# Patient Record
Sex: FEMALE | HISPANIC OR LATINO | Employment: FULL TIME | URBAN - NONMETROPOLITAN AREA
[De-identification: names, ages, dates, MRNs, and addresses within clinical notes are randomized per-mention and may not be internally consistent; named-entity substitution may affect disease eponyms.]

---

## 2020-02-12 ENCOUNTER — OFFICE VISIT (OUTPATIENT)
Dept: URGENT CARE | Facility: PHYSICIAN GROUP | Age: 40
End: 2020-02-12
Payer: COMMERCIAL

## 2020-02-12 VITALS
BODY MASS INDEX: 27.34 KG/M2 | DIASTOLIC BLOOD PRESSURE: 70 MMHG | HEART RATE: 88 BPM | TEMPERATURE: 98.5 F | WEIGHT: 135.6 LBS | HEIGHT: 59 IN | OXYGEN SATURATION: 97 % | SYSTOLIC BLOOD PRESSURE: 110 MMHG | RESPIRATION RATE: 14 BRPM

## 2020-02-12 DIAGNOSIS — R35.0 URINARY FREQUENCY: ICD-10-CM

## 2020-02-12 DIAGNOSIS — N30.00 ACUTE CYSTITIS WITHOUT HEMATURIA: ICD-10-CM

## 2020-02-12 DIAGNOSIS — R30.9 PAINFUL URINATION: ICD-10-CM

## 2020-02-12 DIAGNOSIS — R39.15 URINARY URGENCY: ICD-10-CM

## 2020-02-12 LAB
APPEARANCE UR: ABNORMAL
BILIRUB UR STRIP-MCNC: NEGATIVE MG/DL
COLOR UR AUTO: YELLOW
GLUCOSE UR STRIP.AUTO-MCNC: NEGATIVE MG/DL
INT CON NEG: NEGATIVE
INT CON POS: POSITIVE
KETONES UR STRIP.AUTO-MCNC: NEGATIVE MG/DL
LEUKOCYTE ESTERASE UR QL STRIP.AUTO: ABNORMAL
NITRITE UR QL STRIP.AUTO: NEGATIVE
PH UR STRIP.AUTO: 7 [PH] (ref 5–8)
POC URINE PREGNANCY TEST: NEGATIVE
PROT UR QL STRIP: ABNORMAL MG/DL
RBC UR QL AUTO: ABNORMAL
SP GR UR STRIP.AUTO: 1.03
UROBILINOGEN UR STRIP-MCNC: 0.2 MG/DL

## 2020-02-12 PROCEDURE — 81002 URINALYSIS NONAUTO W/O SCOPE: CPT | Performed by: PHYSICIAN ASSISTANT

## 2020-02-12 PROCEDURE — 81025 URINE PREGNANCY TEST: CPT | Performed by: PHYSICIAN ASSISTANT

## 2020-02-12 PROCEDURE — 99204 OFFICE O/P NEW MOD 45 MIN: CPT | Performed by: PHYSICIAN ASSISTANT

## 2020-02-12 RX ORDER — NITROFURANTOIN 25; 75 MG/1; MG/1
100 CAPSULE ORAL EVERY 12 HOURS
Qty: 10 CAP | Refills: 0 | Status: SHIPPED | OUTPATIENT
Start: 2020-02-12 | End: 2020-02-17

## 2020-02-13 NOTE — PROGRESS NOTES
"Chief Complaint   Patient presents with   • Painful Urination     x 4-5 days       HISTORY OF PRESENT ILLNESS: Patient is a 39 y.o. female who presents today for the following:    Dysuria x 4-5 days  + Increased urinary frequency and urgency, lower abdominal pressure, mild nausea without vomiting  Denies fever, flank pain  History of UTI, feels the same    There are no active problems to display for this patient.      Allergies:Patient has no known allergies.    Current Outpatient Medications Ordered in Epic   Medication Sig Dispense Refill   • hydrocodone-acetaminophen (NORCO) 5-325 MG Tab per tablet Take 1 Tab by mouth every 6 hours as needed. (Patient not taking: Reported on 2/12/2020) 30 Tab 0     No current Epic-ordered facility-administered medications on file.        Past Medical History:   Diagnosis Date   • Headache(784.0)        Social History     Tobacco Use   • Smoking status: Never Smoker   • Smokeless tobacco: Never Used   Substance Use Topics   • Alcohol use: No   • Drug use: No       No family status information on file.     Family History   Problem Relation Age of Onset   • Diabetes Mother    • Hyperlipidemia Mother    • Cancer Maternal Grandmother    • Cancer Maternal Grandfather        Review of Systems:   Constitutional ROS: No unexpected change in weight, No weakness, No fatigue  Pulmonary ROS: No chronic cough, sputum, or hemoptysis, No dyspnea on exertion, No wheezing  Cardiovascular ROS: No diaphoresis, No edema, No palpitations  Gastrointestinal ROS: No change in bowel habits, No significant change in appetite, No nausea, vomiting, diarrhea, or constipation  Hematologic/Lymphatic ROS: No chills, No night sweats, No weight loss  Skin/Integumentary ROS: No edema, No evidence of rash, No itching      Exam:  /70   Pulse 88   Temp 36.9 °C (98.5 °F) (Temporal)   Resp 14   Ht 1.499 m (4' 11\")   Wt 61.5 kg (135 lb 9.6 oz)   SpO2 97%   General: Well developed, well nourished. No " distress.  Pulmonary: Unlabored respiratory effort.   Back: No CVA tenderness noted.  Neurologic: Grossly nonfocal. No facial asymmetry noted.  Skin: Warm, dry, good turgor. No rashes in visible areas.   Psych: Normal mood. Alert and oriented x3. Judgment and insight is normal.    Results for orders placed or performed in visit on 02/12/20   POCT Urinalysis   Result Value Ref Range    POC Color yellow Negative    POC Appearance cloudy Negative    POC Leukocyte Esterase small Negative    POC Nitrites negative Negative    POC Urobiligen 0.2 Negative (0.2) mg/dL    POC Protein trace Negative mg/dL    POC Urine PH 7.0 5.0 - 8.0    POC Blood trace Negative    POC Specific Gravity 1.030 <1.005 - >1.030    POC Ketones negative Negative mg/dL    POC Bilirubin negative Negative mg/dL    POC Glucose negative Negative mg/dL   POCT Pregnancy   Result Value Ref Range    POC Urine Pregnancy Test negative Negative    Internal Control Positive Positive     Internal Control Negative Negative          Assessment/Plan:  Drink plenty of fluids. Will contact patient with culture results. Use all medication as directed. Follow up for worsening or persistent symptoms.  1. Painful urination  POCT Urinalysis    POCT Pregnancy

## 2020-08-11 ENCOUNTER — NON-PROVIDER VISIT (OUTPATIENT)
Dept: URGENT CARE | Facility: PHYSICIAN GROUP | Age: 40
End: 2020-08-11
Payer: COMMERCIAL

## 2020-08-11 ENCOUNTER — APPOINTMENT (OUTPATIENT)
Dept: RADIOLOGY | Facility: IMAGING CENTER | Age: 40
End: 2020-08-11
Attending: PHYSICIAN ASSISTANT
Payer: COMMERCIAL

## 2020-08-11 ENCOUNTER — OCCUPATIONAL MEDICINE (OUTPATIENT)
Dept: URGENT CARE | Facility: PHYSICIAN GROUP | Age: 40
End: 2020-08-11
Payer: COMMERCIAL

## 2020-08-11 VITALS
TEMPERATURE: 98.5 F | DIASTOLIC BLOOD PRESSURE: 78 MMHG | HEIGHT: 59 IN | BODY MASS INDEX: 27.58 KG/M2 | HEART RATE: 92 BPM | WEIGHT: 136.8 LBS | OXYGEN SATURATION: 99 % | SYSTOLIC BLOOD PRESSURE: 118 MMHG | RESPIRATION RATE: 16 BRPM

## 2020-08-11 DIAGNOSIS — S60.021A CONTUSION OF RIGHT INDEX FINGER WITHOUT DAMAGE TO NAIL, INITIAL ENCOUNTER: ICD-10-CM
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
## 2020-08-11 DIAGNOSIS — Z02.1 PRE-EMPLOYMENT DRUG SCREENING: ICD-10-CM

## 2020-08-11 LAB
AMP AMPHETAMINE: NORMAL
BAR BARBITURATES: NORMAL
BREATH ALCOHOL COMMENT: NORMAL
BZO BENZODIAZEPINES: NORMAL
COC COCAINE: NORMAL
INT CON NEG: NORMAL
INT CON POS: NORMAL
MDMA ECSTASY: NORMAL
MET METHAMPHETAMINES: NORMAL
MTD METHADONE: NORMAL
OPI OPIATES: NORMAL
OXY OXYCODONE: NORMAL
PCP PHENCYCLIDINE: NORMAL
POC BREATHALIZER: 0 PERCENT (ref 0–0.01)
POC URINE DRUG SCREEN OCDRS: NEGATIVE
THC: NORMAL

## 2020-08-11 PROCEDURE — 80305 DRUG TEST PRSMV DIR OPT OBS: CPT | Performed by: PHYSICIAN ASSISTANT

## 2020-08-11 PROCEDURE — 99203 OFFICE O/P NEW LOW 30 MIN: CPT | Performed by: PHYSICIAN ASSISTANT

## 2020-08-11 PROCEDURE — 82075 ASSAY OF BREATH ETHANOL: CPT | Performed by: PHYSICIAN ASSISTANT

## 2020-08-11 PROCEDURE — 73130 X-RAY EXAM OF HAND: CPT | Mod: TC,FY,RT | Performed by: PHYSICIAN ASSISTANT

## 2020-08-11 ASSESSMENT — PAIN SCALES - GENERAL: PAINLEVEL: 8=MODERATE-SEVERE PAIN

## 2020-08-11 NOTE — LETTER
August 11, 2020    To Whom It May Concern:         This is confirmation that FeliciaKacy Richardson attended her scheduled appointment with Enio Cortez P.A.-C. on 8/11/20.           If you have any questions please do not hesitate to call me at the phone number listed below.    Sincerely,          Enio Cortez P.A.-C.  [phone]

## 2020-08-11 NOTE — LETTER
"EMPLOYEE’S CLAIM FOR COMPENSATION/ REPORT OF INITIAL TREATMENT  FORM C-4    EMPLOYEE’S CLAIM - PROVIDE ALL INFORMATION REQUESTED   First Name  Fallon Last Name  Danna Richardson Birthdate                    1980                Sex  female Claim Number   Home Address  [address] Age  39 y.o. 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 (if applicable)      How did this injury or occupational disease occur? (Be specific an answer in detail. Use additional sheet if necessary)     If you believe that you have an occupational disease, when did you first have knowledge of the disability and it relationship to your employment?  n/a Witnesses to the Accident  [place]      Nature of Injury or Occupational Disease  Workers' Compensation  Part(s) of Body Injured or Affected  Finger (R), Hand (R),     I certify that the above is true and correct to the best of my knowledge and that I have provided this information in order to obtain the benefits of [place]). "  I hereby authorize any physician, chiropractor, surgeon, practitioner, or other person, any hospital, including [facility] or [facility], any medical service organization, any insurance company, or other institution or organization to release to each other, any medical or other information, including benefits paid or payable, pertinent to this injury or disease, except information relative to diagnosis, treatment and/or counseling for AIDS, psychological conditions, alcohol or controlled substances, for which I must give specific authorization.  A Photostat of this authorization shall be as valid as the original.       Date 8-   [facility]   Employee’s Signature   THIS REPORT MUST BE COMPLETED AND MAILED WITHIN 3 WORKING DAYS OF TREATMENT   [facility]  Name of Facility  [facility]   Date  8/11/2020 Diagnosis  (S60.021A) Contusion of right index finger without damage to nail, initial encounter Is there evidence the injured employee was under the              influence of alcohol and/or another controlled substance at the time of accident?   Hour  6:01 PM Description of Injury or Disease  The encounter diagnosis was Contusion of right index finger without damage to nail, initial encounter. No   Treatment  X-ray images were negative for fracture.  Patient was placed in an aluminum finger splint in a position of comfort and instructed to wear the splint and avoid use of the right hand.  She can ice for 10 to 15 minutes every 2-3 hours and take over-the-counter Tylenol and ibuprofen for pain and discomfort.  I told her to limit use of the finger and return in 3 to 4 days for reevaluation.  Have you advised the patient to remain off work five days or     more? No   X-Ray Findings  Negative   If Yes   From Date  To Date      From information given by the employee, together with medical evidence, can you directly connect this injury or occupational disease as job  "incurred?  Yes If No Full Duty    No Modified Duty  Yes   Is additional medical care by a physician indicated?  Yes If Modified Duty, Specify any Limitations / Restrictions  Limit use of right hand, no lifting with right hand, avoid any actions that cause pain.  Patient must wear splint at all times at work until reevaluated   Do you know of any previous injury or disease contributing to this condition or occupational disease?                            No   Date  8/11/2020 Print Doctor’s Name   Enio Cortez P.A.-C. I certify the employer’s copy of  this form was mailed on:   Address  [address] Insurer’s Use Only     [facility] [facility]  [address]    Provider’s Tax ID Number  [id number] Telephone  Dept: [phone]      e-ENIO Ramriez P.A.-C.  Signature:     Degree          ORIGINAL-TREATING PHYSICIAN OR CHIROPRACTOR    PAGE 2-INSURER/TPA    PAGE 3-EMPLOYER    PAGE 4-EMPLOYEE        Form C-4 (rev.10/07)          BRIEF DESCRIPTION OF RIGHTS AND BENEFITS  (Pursuant to [place]RS 616C.050)    Notice of Injury or Occupational Disease (Incident Report Form C-1): If an injury or occupational disease (OD) arises out of and in the course of employment, you must provide written notice to your employer as soon as practicable, but no later than 7 days after the accident or OD. Your employer shall maintain a sufficient supply of the required forms.     Claim for Compensation (Form C-4): If medical treatment is sought, the form C-4 is available at the place of initial treatment. A completed \"Claim for Compensation\" (Form C-4) must be filed within 90 days after an accident or OD. The treating physician or chiropractor must, within 3 working days after treatment, complete and mail to the employer, the employer's insurer and third-party , the Claim for Compensation.     Medical Treatment: If you require medical treatment for your on-the-job injury or OD, you may be required to select a " physician or chiropractor from a list provided by your workers’ compensation insurer, if it has contracted with an Organization for Managed Care (MCO) or Preferred Provider Organization (PPO) or providers of health care. If your employer has not entered into a contract with an MCO or PPO, you may select a physician or chiropractor from the Panel of Physicians and Chiropractors. Any medical costs related to your industrial injury or OD will be paid by your insurer.     Temporary Total Disability (TTD): If your doctor has certified that you are unable to work for a period of at least 5 consecutive days, or 5 cumulative days in a 20-day period, or places restrictions on you that your employer does not accommodate, you may be entitled to TTD compensation.     Temporary Partial Disability (TPD): If the wage you receive upon reemployment is less than the compensation for TTD to which you are entitled, the insurer may be required to pay you TPD compensation to make up the difference. TPD can only be paid for a maximum of 24 months.     Permanent Partial Disability (PPD): When your medical condition is stable and there is an indication of a PPD as a result of your injury or OD, within 30 days, your insurer must arrange for an evaluation by a rating physician or chiropractor to determine the degree of your PPD. The amount of your PPD award depends on the date of injury, the results of the PPD evaluation and your age and wage.     Permanent Total Disability (PTD): If you are medically certified by a treating physician or chiropractor as permanently and totally disabled and have been granted a PTD status by your insurer, you are entitled to receive monthly benefits not to exceed 66 2/3% of your average monthly wage. The amount of your PTD payments is subject to reduction if you previously received a PPD award.     Vocational Rehabilitation Services: You may be eligible for vocational rehabilitation services if you are unable  to return to the job due to a permanent physical impairment or permanent restrictions as a result of your injury or occupational disease.     Transportation and Per Bharathi Reimbursement: You may be eligible for travel expenses and per bharathi associated with medical treatment.     Reopening: You may be able to reopen your claim if your condition worsens after claim closure.     Appeal Process: If you disagree with a written determination issued by the insurer or the insurer does not respond to your request, you may appeal to the Department of Administration, , by following the instructions contained in your determination letter. You must appeal the determination within 70 days from the date of the determination letter at [address], [address], [place], [place] [address], or [address], [address], [place], [place] [address]. If you disagree with the  decision, you may appeal to the Department of Administration, . You must file your appeal within 30 days from the date of the  decision letter at [address], [address], [place], [place] [address], or [address] [facility] [address], [place] [address]. If you disagree with a decision of an , you may file a petition for judicial review with the District Court. You must do so within 30 days of the Appeal Officer’s decision. You may be represented by an  at your own expense or you may contact the [facility] for possible representation.     [place]  for Injured Workers (NAIW): If you disagree with a  decision, you may request that NAIW represent you without charge at an  Hearing. For information regarding denial of benefits, you may contact the [facility] at: [address], [address], [place] [address], [phone], or [address] [address], [phone]     To File a Complaint with the  Division: If you wish to file a complaint with the  of the Division of Industrial Relations (DIR), please contact the Workers’ Compensation Section, [address], [address], [place], [place] [address], telephone [phone], or [address], [address], [place], [place] [address], telephone [phone].     For assistance with Workers’ Compensation Issues: You may contact the Office of the Governor Consumer Health Assistance, [address] [place] [address], [address], [place], [place] [address], Toll Free 1-647.885.1990, Web site: [email or web address], E-mail [email or web address].              __________________________________________________________________                              ___________________         Employee Name / Signature                                                                                                                     Date                                                                                                                                                                                       D-2 (rev. 01/20)

## 2020-08-11 NOTE — LETTER
[address] [place] [address]  Phone:  [phone] - Fax:  [phone]   [facility] Progress Report and Disability Certification  Date of Service: 8/11/2020   No Show:  No  Date / Time of Next Visit: 8/15/2020   Claim Information   Patient Name: Fallon Richardson  Claim Number:     Employer: [employer]  Date of Injury: 8/11/2020     Insurer / TPA: [facility]  ID / SSN:     Occupation: Co facilitator  Diagnosis: The encounter diagnosis was Contusion of right index finger without damage to nail, initial encounter.    Medical Information   Related to Industrial Injury? Yes    Subjective Complaints:  Date of injury August 11, 2020 around 0600 hrs.  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  She had moderate pain during the event and the pain continued throughout the day and became worse the more she used the hand.  She did not notice any skin breaks.  She has no previous injury she is a right-handed female with no second job.  She has range of motion of the hand intact but it causes pain.  She has not noticed any swelling.   Objective Findings: Alert nontoxic female in no acute distress.  Mild tenderness to palpation over the distal second metatarsal, MTP, and PIP.  Flexion extension of the hand and fingers is intact.  No neurovascular compromise.  Abduction of index finger to thumb is intact.  No tenderness over the other bony prominences of the hand.  No abrasion or laceration.  Brisk cap refill in the finger.  Sensation to dull and light touch intact.   Pre-Existing Condition(s): None   Assessment:   Initial Visit    Status: Additional Care Required  Permanent Disability:No    Plan:      Diagnostics: X-ray    Comments:  X-rays were negative for fracture suspect soft tissue injury    Disability Information   Status: Released to Restricted  Duty    From:  8/11/2020  Through: 8/15/2020 Restrictions are: Temporary   Physical Restrictions   Sitting:    Standing:    Stooping:    Bending:      Squatting:    Walking:    Climbing:    Pushing:      Pulling:    Other:    Reaching Above Shoulder (L):   Reaching Above Shoulder (R):       Reaching Below Shoulder (L):    Reaching Below Shoulder (R):      Not to exceed Weight Limits   Carrying(hrs):   Weight Limit(lb):   Lifting(hrs):   Weight  Limit(lb):     Comments: Limit use of right hand, no lifting with right hand, avoid any actions that cause pain.  Patient must wear splint at all times at work until reevaluated    Repetitive Actions   Hands: i.e. Fine Manipulations from Grasping:     Feet: i.e. Operating Foot Controls:     Driving / Operate Machinery:     Provider Name:   Enio Cortez P.A.-C. Physician Signature:  Physician Name:     Clinic Name / Location: [address] [address] Clinic Phone Number: Dept: [phone]   Appointment Time: 4:55 Pm Visit Start Time: 6:01 PM   Check-In Time:  5:59 Pm Visit Discharge Time: 7:15 Pm    Original-Treating Physician or Chiropractor    Page 2-Insurer/TPA    Page 3-Employer    Page 4-Employee

## 2020-08-12 NOTE — PROGRESS NOTES
"Subjective:     Fallon Richardson is a 39 y.o. female who presents for Work-Related Injury (happened at work around 6, around 2:30 felt the most pain)      Date of injury August 11, 2020 around 0600 hrs.  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  She had moderate pain during the event and the pain continued throughout the day and became worse the more she used the hand.  She did not notice any skin breaks.  She has no previous injury she is a right-handed female with no second job.  She has range of motion of the hand intact but it causes pain.  She has not noticed any swelling.    PMH:   No pertinent past medical history to this problem  MEDS:  Medications were reviewed in EMR  ALLERGIES:  Allergies were reviewed in EMR  SOCHX:  Works as a   FH:   No pertinent family history to this problem       Objective:     /78   Pulse 92   Temp 36.9 °C (98.5 °F) (Temporal)   Resp 16   Ht 1.499 m (4' 11\")   Wt 62.1 kg (136 lb 12.8 oz)   SpO2 99%   BMI 27.63 kg/m²     Alert nontoxic female in no acute distress.  Mild tenderness to palpation over the distal second metatarsal, MTP, and PIP.  Flexion extension of the hand and fingers is intact.  No neurovascular compromise.  Abduction of index finger to thumb is intact.  No tenderness over the other bony prominences of the hand.  No abrasion or laceration.  Brisk cap refill in the finger.  Sensation to dull and light touch intact.      RADIOLOGY RESULTS   Dx-hand 3+ Right    Result Date: 8/11/2020 8/11/2020 6:44 PM HISTORY/REASON FOR EXAM:  Pain/Deformity Following Trauma; crush injury this am, pain over 2nd MCP, PIP, no deformity, ROM NML TECHNIQUE/EXAM DESCRIPTION AND NUMBER OF VIEWS: 3 views of the RIGHT hand. COMPARISON:  None FINDINGS: There is no evidence of fracture or dislocation. Alignment is maintained. No periosteal new bone formation or osseous " erosion is identified.     No evidence of acute fracture or dislocation.           Assessment/Plan:       1. Contusion of right index finger without damage to nail, initial encounter  - DX-HAND 3+ RIGHT; Future    • Released to Restricted Duty FROM 8/11/2020 TO 8/15/2020  • Limit use of right hand, no lifting with right hand, avoid any actions that cause pain.  Patient must wear splint at all times at work until reevaluated  • X-rays were negative for fracture suspect soft tissue injury    Differential diagnosis, natural history, supportive care, and indications for immediate follow-up discussed.

## 2020-08-12 NOTE — PATIENT INSTRUCTIONS
RICE Therapy for Routine Care of Injuries  Many injuries can be cared for with rest, ice, compression, and elevation (RICE therapy). This includes:  · Resting the injured part.  · Putting ice on the injury.  · Putting pressure (compression) on the injury.  · Raising the injured part (elevation).  Using RICE therapy can help to lessen pain and swelling.  Supplies needed:  · Ice.  · Plastic bag.  · Towel.  · Elastic bandage.  · Pillow or pillows to raise (elevate) your injured body part.  How to care for your injury with RICE therapy  Rest  Limit your normal activities, and try not to use the injured part of your body. You can go back to your normal activities when your doctor says it is okay to do them and you feel okay. Ask your doctor if you should do exercises to help your injury get better.  Ice  Put ice on the injured area. Do not put ice on your bare skin.  · Put ice in a plastic bag.  · Place a towel between your skin and the bag.  · Leave the ice on for 20 minutes, 2-3 times a day. Use ice on as many days as told by your doctor.    Compression  Compression means putting pressure on the injured area. This can be done with an elastic bandage. If an elastic bandage has been put on your injury:  · Do not wrap the bandage too tight. Wrap the bandage more loosely if part of your body away from the bandage is blue, swollen, cold, painful, or loses feeling (gets numb).  · Take off the bandage and put it on again. Do this every 3-4 hours or as told by your doctor.  · See your doctor if the bandage seems to make your problems worse.    Elevation  Elevation means keeping the injured area raised. If you can, raise the injured area above your heart or the center of your chest.  Contact a doctor if:  · You keep having pain and swelling.  · Your symptoms get worse.  Get help right away if:  · You have sudden bad pain at your injury or lower than your injury.  · You have redness or more swelling around your injury.  · You  have tingling or numbness at your injury or lower than your injury, and it does not go away when you take off the bandage.  Summary  · Many injuries can be cared for using rest, ice, compression, and elevation (RICE therapy).  · You can go back to your normal activities when you feel okay and your doctor says it is okay.  · Put ice on the injured area as told by your doctor.  · Get help if your symptoms get worse or if you keep having pain and swelling.  This information is not intended to replace advice given to you by your health care provider. Make sure you discuss any questions you have with your health care provider.  Document Released: 06/05/2009 Document Revised: 09/07/2018 Document Reviewed: 09/07/2018  Elsevier Patient Education © 2020 Elsevier Inc.

## 2020-08-15 ENCOUNTER — OCCUPATIONAL MEDICINE (OUTPATIENT)
Dept: URGENT CARE | Facility: PHYSICIAN GROUP | Age: 40
End: 2020-08-15
Payer: COMMERCIAL

## 2020-08-15 VITALS
SYSTOLIC BLOOD PRESSURE: 124 MMHG | HEIGHT: 59 IN | BODY MASS INDEX: 27.7 KG/M2 | DIASTOLIC BLOOD PRESSURE: 84 MMHG | OXYGEN SATURATION: 99 % | HEART RATE: 85 BPM | RESPIRATION RATE: 16 BRPM | WEIGHT: 137.4 LBS | TEMPERATURE: 98.5 F

## 2020-08-15 DIAGNOSIS — S60.121D CONTUSION OF RIGHT INDEX FINGER WITH DAMAGE TO NAIL, SUBSEQUENT ENCOUNTER: ICD-10-CM

## 2020-08-15 PROCEDURE — 99213 OFFICE O/P EST LOW 20 MIN: CPT | Performed by: FAMILY MEDICINE

## 2020-08-15 ASSESSMENT — PAIN SCALES - GENERAL: PAINLEVEL: 4=SLIGHT-MODERATE PAIN

## 2020-08-15 NOTE — PROGRESS NOTES
"Subjective:      Fallon Richardson is a 39 y.o. female who presents with Follow-Up (workers comp follow up)      Date of injury August 11, 2020  Patient is here for follow-up crush injury on right index finger.  Still having some pain but it is slightly better.  She is wearing the splint at work     HPI    ROS       Objective:     /84   Pulse 85   Temp 36.9 °C (98.5 °F) (Temporal)   Resp 16   Ht 1.499 m (4' 11\")   Wt 62.3 kg (137 lb 6.4 oz)   SpO2 99%   BMI 27.75 kg/m²      Physical Exam    No acute distress  Full range of motion the right index finger.  Tenderness on palpation of the proximal phalanx which is slightly more swollen as compared to the left index finger.  Neurovascular otherwise intact.       Assessment/Plan:        1. Contusion of right index finger with damage to nail, subsequent encounter    Improved but still having some pain.  Continue restriction for another week.      "

## 2020-08-15 NOTE — LETTER
[address] [place] [address]  Phone:  [phone] - Fax:  [phone]   [facility] Progress Report and Disability Certification  Date of Service: 8/15/2020   No Show:  No  Date / Time of Next Visit: 8/22/2020   Claim Information   Patient Name: Fallon Richardson  Claim Number:     Employer: [employer]  Date of Injury: 8/11/2020     Insurer / TPA: [facility]  ID / SSN:     Occupation: Co facilitator  Diagnosis: The encounter diagnosis was Contusion of right index finger with damage to nail, subsequent encounter.    Medical Information   Related to Industrial Injury? Yes    Subjective Complaints:  Date of injury August 11, 2020  Patient is here for follow-up crush injury on right index finger.  Still having some pain but it is slightly better.  She is wearing the splint at work   Objective Findings: No acute distress  Full range of motion the right index finger.  Tenderness on palpation of the proximal phalanx which is slightly more swollen as compared to the left index finger.  Neurovascular otherwise intact.   Pre-Existing Condition(s):     Assessment:        Status: Additional Care Required  Permanent Disability:No    Plan:      Diagnostics:      Comments:       Disability Information   Status: Released to Restricted Duty    From:  8/15/2020  Through: 8/22/2020 Restrictions are: Temporary   Physical Restrictions   Sitting:    Standing:    Stooping:    Bending:      Squatting:    Walking:    Climbing:    Pushing:      Pulling:    Other:    Reaching Above Shoulder (L):   Reaching Above Shoulder (R):       Reaching Below Shoulder (L):    Reaching Below Shoulder (R):      Not to exceed Weight Limits   Carrying(hrs):   Weight Limit(lb):   Lifting(hrs):   Weight  Limit(lb):     Comments: Limited use of right hand.  Wear the splint on the right index finger if needed at work.  Over-the-counter medication as needed for pain.  Icing frequently helps.  Follow-up in a week in urgent care, sooner if any worsening or new symptoms.    Repetitive Actions   Hands: i.e. Fine Manipulations from Grasping:     Feet: i.e. Operating Foot Controls:     Driving / Operate Machinery:     Provider Name:   Adri Tipton M.D. Physician Signature:  Physician Name:     Clinic Name / Location: [address] [address] Clinic Phone Number: Dept: [phone]   Appointment Time: 9:00 Am Visit Start Time: 9:10 AM   Check-In Time:  8:20 Am Visit Discharge Time:  9:40 AM   Original-Treating Physician or Chiropractor    Page 2-Insurer/TPA    Page 3-Employer    Page 4-Employee

## 2022-11-18 ENCOUNTER — OFFICE VISIT (OUTPATIENT)
Dept: URGENT CARE | Facility: PHYSICIAN GROUP | Age: 42
End: 2022-11-18

## 2022-11-18 ENCOUNTER — HOSPITAL ENCOUNTER (OUTPATIENT)
Facility: MEDICAL CENTER | Age: 42
End: 2022-11-18
Attending: PHYSICIAN ASSISTANT

## 2022-11-18 VITALS
WEIGHT: 143 LBS | DIASTOLIC BLOOD PRESSURE: 60 MMHG | HEART RATE: 72 BPM | RESPIRATION RATE: 16 BRPM | BODY MASS INDEX: 27 KG/M2 | HEIGHT: 61 IN | SYSTOLIC BLOOD PRESSURE: 134 MMHG | OXYGEN SATURATION: 98 % | TEMPERATURE: 97 F

## 2022-11-18 DIAGNOSIS — R10.2 PELVIC PAIN: ICD-10-CM

## 2022-11-18 LAB
APPEARANCE UR: CLEAR
BILIRUB UR STRIP-MCNC: NEGATIVE MG/DL
COLOR UR AUTO: NORMAL
GLUCOSE UR STRIP.AUTO-MCNC: NEGATIVE MG/DL
INT CON NEG: NORMAL
INT CON POS: NORMAL
KETONES UR STRIP.AUTO-MCNC: NEGATIVE MG/DL
LEUKOCYTE ESTERASE UR QL STRIP.AUTO: NEGATIVE
NITRITE UR QL STRIP.AUTO: NEGATIVE
PH UR STRIP.AUTO: 5.5 [PH] (ref 5–8)
POC URINE PREGNANCY TEST: NEGATIVE
PROT UR QL STRIP: NORMAL MG/DL
RBC UR QL AUTO: NORMAL
SP GR UR STRIP.AUTO: 1.03
UROBILINOGEN UR STRIP-MCNC: 0.2 MG/DL

## 2022-11-18 PROCEDURE — 87086 URINE CULTURE/COLONY COUNT: CPT

## 2022-11-18 PROCEDURE — 99214 OFFICE O/P EST MOD 30 MIN: CPT | Performed by: PHYSICIAN ASSISTANT

## 2022-11-18 PROCEDURE — 81025 URINE PREGNANCY TEST: CPT | Performed by: PHYSICIAN ASSISTANT

## 2022-11-18 PROCEDURE — 81002 URINALYSIS NONAUTO W/O SCOPE: CPT | Performed by: PHYSICIAN ASSISTANT

## 2022-11-18 ASSESSMENT — ENCOUNTER SYMPTOMS
NAUSEA: 1
SHORTNESS OF BREATH: 0
CHANGE IN BOWEL HABIT: 0
ANOREXIA: 0
CHILLS: 0
VOMITING: 0
BACK PAIN: 1
HEADACHES: 0
SORE THROAT: 0
WHEEZING: 0
FLANK PAIN: 0
FATIGUE: 1
ABDOMINAL PAIN: 1
FEVER: 0
BLOOD IN STOOL: 0
COUGH: 0
DIARRHEA: 0

## 2022-11-18 NOTE — PROGRESS NOTES
Subjective     Fallon Richardson is a 42 y.o. female who presents with Abdominal Pain (Pain started in left side and back now radiating into the front. Having white discharge, no change in urination/urine. X 1 wk )            Pelvic Pain  This is a recurrent problem. Episode onset: recent epidose started 1 week ago- Occurs every 3 months or so. The problem occurs constantly. The problem has been unchanged. Associated symptoms include abdominal pain (suprapubic pain/pelvic pain), fatigue and nausea. Pertinent negatives include no anorexia, change in bowel habit, chest pain, chills, congestion, coughing, fever, headaches, rash, sore throat, urinary symptoms or vomiting. Associated symptoms comments: Abdominal bloating . Nothing aggravates the symptoms. She has tried nothing for the symptoms.       Patient reports history of uterine fibroids.   He last menstrual cycle was 1 week ago.  She denies abdominal surgery.    Past Medical History:   Diagnosis Date    Headache(784.0)          History reviewed. No pertinent surgical history.      Family History   Problem Relation Age of Onset    Diabetes Mother     Hyperlipidemia Mother     Cancer Maternal Grandmother     Cancer Maternal Grandfather      No Known Allergies      Medications, Allergies, and current problem list reviewed today in Epic      Review of Systems   Constitutional:  Positive for fatigue and malaise/fatigue. Negative for chills and fever.   HENT:  Negative for congestion and sore throat.    Respiratory:  Negative for cough, shortness of breath and wheezing.    Cardiovascular:  Negative for chest pain and leg swelling.   Gastrointestinal:  Positive for abdominal pain (suprapubic pain/pelvic pain) and nausea. Negative for anorexia, blood in stool, change in bowel habit, diarrhea and vomiting.   Genitourinary:  Positive for pelvic pain. Negative for dysuria, flank pain, frequency, hematuria and urgency.   Musculoskeletal:  Positive for back pain (low  "back pain).   Skin:  Negative for rash.   Neurological:  Negative for headaches.        All other systems reviewed and are negative.         Objective     /60   Pulse 72   Temp 36.1 °C (97 °F) (Temporal)   Resp 16   Ht 1.549 m (5' 1\")   Wt 64.9 kg (143 lb)   SpO2 98%   BMI 27.02 kg/m²      Physical Exam  Constitutional:       General: She is not in acute distress.     Appearance: She is not ill-appearing.   HENT:      Head: Normocephalic and atraumatic.   Eyes:      General: No scleral icterus.     Conjunctiva/sclera: Conjunctivae normal.   Cardiovascular:      Rate and Rhythm: Normal rate and regular rhythm.   Pulmonary:      Effort: Pulmonary effort is normal. No respiratory distress.      Breath sounds: No stridor. No wheezing.   Abdominal:      General: There is distension (mild).      Palpations: Abdomen is soft. There is no mass.      Tenderness: There is abdominal tenderness (suprapubic- moderate TTP) in the suprapubic area. There is no right CVA tenderness, left CVA tenderness, guarding or rebound. Negative signs include Mishra's sign and McBurney's sign.   Skin:     General: Skin is warm and dry.   Neurological:      General: No focal deficit present.      Mental Status: She is alert and oriented to person, place, and time.   Psychiatric:         Mood and Affect: Mood normal.         Behavior: Behavior normal.         Thought Content: Thought content normal.         Judgment: Judgment normal.              HCG- negative    UA- no leuks, no nitrates, trace- blood          Assessment & Plan        1. Pelvic pain    - POCT Urinalysis  - POCT Pregnancy  - URINE CULTURE(NEW); Future  - US-PELVIC COMPLETE (TRANSABDOMINAL/TRANSVAGINAL) (COMBO); Future       Patient given work note.  No signs of acute abdomen.  Order for Ultrasound.     OTC analgesics   Heating pad  ER if severe pain.    Differential diagnoses, Supportive care, and indications for immediate follow-up discussed with patient. "   Pathogenesis of diagnosis discussed including typical length and natural progression.   Instructed to return to clinic or nearest emergency department for any change in condition, further concerns, or worsening of symptoms.        The patient demonstrated a good understanding and agreed with the treatment plan.    Swati Renteria P.A.-C.

## 2022-11-18 NOTE — LETTER
November 18, 2022         Patient: Fallon Richardson   YOB: 1980   Date of Visit: 11/18/2022           To Whom it May Concern:    Fallon Richardson was seen in my clinic on 11/18/2022. She should be excused from work today for medical reasons.    If you have any questions or concerns, please don't hesitate to call.        Sincerely,           Swati Renteria P.A.-C.  Electronically Signed

## 2022-11-21 LAB
BACTERIA UR CULT: NORMAL
SIGNIFICANT IND 70042: NORMAL
SITE SITE: NORMAL
SOURCE SOURCE: NORMAL

## 2022-12-06 ENCOUNTER — APPOINTMENT (OUTPATIENT)
Dept: RADIOLOGY | Facility: IMAGING CENTER | Age: 42
End: 2022-12-06
Attending: FAMILY MEDICINE
Payer: COMMERCIAL

## 2022-12-06 ENCOUNTER — OCCUPATIONAL MEDICINE (OUTPATIENT)
Dept: URGENT CARE | Facility: PHYSICIAN GROUP | Age: 42
End: 2022-12-06
Payer: COMMERCIAL

## 2022-12-06 VITALS
HEIGHT: 61 IN | RESPIRATION RATE: 16 BRPM | SYSTOLIC BLOOD PRESSURE: 138 MMHG | HEART RATE: 85 BPM | BODY MASS INDEX: 27 KG/M2 | DIASTOLIC BLOOD PRESSURE: 70 MMHG | WEIGHT: 143 LBS | TEMPERATURE: 97.7 F | OXYGEN SATURATION: 100 %

## 2022-12-06 DIAGNOSIS — S50.11XA CONTUSION OF RIGHT FOREARM, INITIAL ENCOUNTER: ICD-10-CM

## 2022-12-06 PROCEDURE — 99213 OFFICE O/P EST LOW 20 MIN: CPT | Performed by: FAMILY MEDICINE

## 2022-12-06 PROCEDURE — 73090 X-RAY EXAM OF FOREARM: CPT | Mod: TC,FY,RT | Performed by: FAMILY MEDICINE

## 2022-12-06 RX ORDER — KETOROLAC TROMETHAMINE 30 MG/ML
30 INJECTION, SOLUTION INTRAMUSCULAR; INTRAVENOUS ONCE
Status: COMPLETED | OUTPATIENT
Start: 2022-12-06 | End: 2022-12-06

## 2022-12-06 RX ADMIN — KETOROLAC TROMETHAMINE 30 MG: 30 INJECTION, SOLUTION INTRAMUSCULAR; INTRAVENOUS at 19:05

## 2022-12-06 NOTE — LETTER
"EMPLOYEE’S CLAIM FOR COMPENSATION/ REPORT OF INITIAL TREATMENT  FORM C-4    EMPLOYEE’S CLAIM - PROVIDE ALL INFORMATION REQUESTED   First Name  Fallon Last Name  Danna Richardson Birthdate                    1980                Sex  female Claim Number (Insurer’s Use Only)    Home Address  [address] Age  42 y.o. 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 (Be specific an answer in detail. 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  n/a Witnesses to the Accident  Lidia Elise      Nature of Injury or Occupational Disease  Contusion  Part(s) of Body Injured or Affected  Elbow (R), Lower Arm (R), Upper Arm (R)    I certify that the above " is true and correct to the best of my knowledge and that I have provided this information in order to obtain the benefits of [place]  I hereby authorize any physician, chiropractor, surgeon, practitioner, or other person, any hospital, including [facility] or [facility], any medical service organization, any insurance company, or other institution or organization to release to each other, any medical or other information, including benefits paid or payable, pertinent to this injury or disease, except information relative to diagnosis, treatment and/or counseling for AIDS, psychological conditions, alcohol or controlled substances, for which I must give specific authorization.  A Photostat of this authorization shall be as valid as the original.     Date 12/06/2022   Place [facility] Employee’s Original or  *Electronic Signature   THIS REPORT MUST BE COMPLETED AND MAILED WITHIN 3 WORKING DAYS OF TREATMENT   Place  [facility]  Name of Facility  [facility]   Date  12/6/2022 Diagnosis and Description of Injury or Occupational Disease  (S50.11XA) Contusion of right forearm, initial encounter Is there evidence the injured employee was under the influence of alcohol and/or another controlled substance at the time of accident?  ? No ? Yes (if yes, please explain)    Hour  6:05 PM   The encounter diagnosis was Contusion of right forearm, initial encounter. No   Treatment  Warned of delayed bruising and swelling. Arm sling provided to wear on right arm as needed. Toradol (Ketorolac) 30 mg injection given for anti-inflammatory effect. May take over-the-counter Acetaminophen (Tylenol) as needed for pain. May take over-the-counter Ibuprofen (Motrin or Advil) OR Naproxen (Aleve) as needed for pain and swelling for anti-inflammatory effect starting on 12/7/22.  Have you advised the  patient to remain off work five days or     more?    X-Ray Findings  Negative  Comments:Right forearm x-rays: No acute osseous abnormality.   ? Yes Indicate dates:   From   To      From information given by the employee, together with medical evidence, can        you directly connect this injury or occupational disease as job incurred?  Yes ? No If no, is the injured employee capable of:  ? full duty  No ? modified duty  Yes   Is additional medical care by a physician indicated?  Yes  Comments:Return on 12/9/22 or sooner if needed. If Modified Duty, Specify any Limitations / Restrictions  No use of right arm. Wear arm sling on right arm as needed.   Do you know of any previous injury or disease contributing to this condition or occupational disease?  ? Yes ? No (Explain if yes)                          No   Date  12/6/2022 Print Health Care Provider's   Mathew Amin M.D. I certify the employer’s copy of  this form was mailed on:   Address  [address] Insurer’s Use Only     [facility] [place]  [address]    Provider’s Tax ID Number  [id number] Telephone  Dept: [phone]             Health Care Provider’s Original or Electronic Signature  e-MATHEW Saldaña M.D. Degree (MD,DO, DC,PA-C,APRN)   MD      * Complete and attach Release of Information (Form C-4A) when injured employee signs C-4 Form electronically  ORIGINAL - TREATING HEALTHCARE PROVIDER PAGE 2 - INSURER/TPA PAGE 3 - EMPLOYER PAGE 4 - EMPLOYEE             Form C-4 (rev.08/21)           BRIEF DESCRIPTION OF RIGHTS AND BENEFITS  (Pursuant to [place]RS 616C.050)    Notice of Injury or Occupational Disease (Incident Report Form C-1): If an injury or occupational disease (OD) arises out of and in the course of employment, you must provide written notice to your employer as soon as practicable, but no later than 7 days after the accident or OD. Your employer shall maintain a sufficient supply of the required forms.    Claim for Compensation  "(Form C-4): If medical treatment is sought, the form C-4 is available at the place of initial treatment. A completed \"Claim for Compensation\" (Form C-4) must be filed within 90 days after an accident or OD. The treating physician or chiropractor must, within 3 working days after treatment, complete and mail to the employer, the employer's insurer and third-party , the Claim for Compensation.    Medical Treatment: If you require medical treatment for your on-the-job injury or OD, you may be required to select a physician or chiropractor from a list provided by your workers’ compensation insurer, if it has contracted with an Organization for Managed Care (MCO) or Preferred Provider Organization (PPO) or providers of health care. If your employer has not entered into a contract with an MCO or PPO, you may select a physician or chiropractor from the Panel of Physicians and Chiropractors. Any medical costs related to your industrial injury or OD will be paid by your insurer.    Temporary Total Disability (TTD): If your doctor has certified that you are unable to work for a period of at least 5 consecutive days, or 5 cumulative days in a 20-day period, or places restrictions on you that your employer does not accommodate, you may be entitled to TTD compensation.    Temporary Partial Disability (TPD): If the wage you receive upon reemployment is less than the compensation for TTD to which you are entitled, the insurer may be required to pay you TPD compensation to make up the difference. TPD can only be paid for a maximum of 24 months.    Permanent Partial Disability (PPD): When your medical condition is stable and there is an indication of a PPD as a result of your injury or OD, within 30 days, your insurer must arrange for an evaluation by a rating physician or chiropractor to determine the degree of your PPD. The amount of your PPD award depends on the date of injury, the results of the PPD evaluation, your " age and wage.    Permanent Total Disability (PTD): If you are medically certified by a treating physician or chiropractor as permanently and totally disabled and have been granted a PTD status by your insurer, you are entitled to receive monthly benefits not to exceed 66 2/3% of your average monthly wage. The amount of your PTD payments is subject to reduction if you previously received a lump-sum PPD award.    Vocational Rehabilitation Services: You may be eligible for vocational rehabilitation services if you are unable to return to the job due to a permanent physical impairment or permanent restrictions as a result of your injury or occupational disease.    Transportation and Per Bharathi Reimbursement: You may be eligible for travel expenses and per bharathi associated with medical treatment.    Reopening: You may be able to reopen your claim if your condition worsens after claim closure.     Appeal Process: If you disagree with a written determination issued by the insurer or the insurer does not respond to your request, you may appeal to the Department of Administration, , by following the instructions contained in your determination letter. You must appeal the determination within 70 days from the date of the determination letter at [address], [address], [place], [place] [address], or [address] [facility] [address], [place] [address]. If you disagree with the  decision, you may appeal to the Department of Administration, . You must file your appeal within 30 days from the date of the  decision letter at [address], [address], [place], [place] [address], or [address] [facility] [address] [facility] [address], [place] [address]. If you disagree with a decision of an , you may file a petition for judicial review with the District Court. You must do so within 30 days of the Appeal Officer’s decision. You may be represented by an   at your own expense or you may contact the [facility] for possible representation.    [place]  for Injured Workers (NAIW): If you disagree with a  decision, you may request that NAIW represent you without charge at an  Hearing. For information regarding denial of benefits, you may contact the [facility] at: [address], [address], [place] [address], [phone], or [address], [address], [place] [address], [phone]    To File a Complaint with the Division: If you wish to file a complaint with the  of the Division of Industrial Relations (DIR),  please contact the Workers’ Compensation Section, [address], [address], [place], [place] [address], telephone ([phone]) [phone], or [address], [address], [place], [place] [address], telephone [phone]) [phone].    For assistance with Workers’ Compensation Issues: You may contact the [facility] Office for Consumer Health Assistance, [address], [address], [place], [place] [address], Toll Free 1-932.668.9043, Web site: http://[facility].[place].gov/Programs/SANCHEZ E-mail: sanchez@[email or web address].[email or web address].[facility]              __________________________________________________________________                                    __12/06/2022______            Employee Name / Signature                                                                                                                            Date                                                                                                                                                                                                                              D-2 (rev. 10/20)

## 2022-12-06 NOTE — LETTER
[address] [place] [address]  Phone:  [phone] - Fax:  [phone]   [facility] Progress Report and Disability Certification  Date of Service: 12/6/2022   No Show:  No  Date / Time of Next Visit: 12/9/2022   Claim Information   Patient Name: Fallon Richardson  Claim Number:     Employer: [employer] *** Date of Injury: 12/6/2022     Insurer / TPA: [employer] *** ID / SSN:     Occupation: lead *** Diagnosis: The encounter diagnosis was Contusion of right forearm, initial encounter.    Medical Information   Related to Industrial Injury? Yes ***   Subjective Complaints:  DOI: 12/6/22. Got hit by forklift in right forearm today, causing pain. Tylenol did not help pain. She is right-handed.   Objective Findings: Tender to palpation in right forearm. No tenderness to palpation in right shoulder, elbow, wrist, and hand.   Pre-Existing Condition(s): None.   Assessment:   Initial Visit    Status: Additional Care Required  Comments:Return on 12/9/22 or sooner if needed.  Permanent Disability:No    Plan: Medication  Comments:See below.    Diagnostics: X-ray  Comments:Right forearm x-rays: No acute osseous abnormality.    Comments:  Warned of delayed bruising and swelling. Arm sling provided to wear on right arm as needed.    Toradol (Ketorolac) 30 mg injection given for anti-inflammatory effect. May take over-the-counter Acetaminophen (Tylenol) as needed for pain. May take over-the-counter Ibuprofen (Motrin or Advil) OR Naproxen (Aleve) as needed for pain and swelling for anti-inflammatory effect starting on 12/7/22.    Disability Information   Status: Released to Restricted Duty    From:  12/6/2022  Through: 12/9/2022 Restrictions are: Temporary   Physical Restrictions   Sitting:    Standing:    Stooping:    Bending:      Squatting:    Walking:    Climbing:    Pushing:      Pulling:    Other:    Reaching Above  Shoulder (L):   Reaching Above Shoulder (R):       Reaching Below Shoulder (L):    Reaching Below Shoulder (R):      Not to exceed Weight Limits   Carrying(hrs):   Weight Limit(lb):   Lifting(hrs):   Weight  Limit(lb):     Comments: No use of right arm. Wear arm sling on right arm as needed.    Repetitive Actions   Hands: i.e. Fine Manipulations from Grasping:     Feet: i.e. Operating Foot Controls:     Driving / Operate Machinery:     Health Care Provider’s Original or Electronic Signature  Mathew Amin M.D. Health Care Provider’s Original or Electronic Signature    Avni Artis DO MPH     Clinic Name / Location: [address] [address] Clinic Phone Number: Dept: [phone]   Appointment Time: 3:30 Pm Visit Start Time: 6:05 PM   Check-In Time:  3:25 Pm Visit Discharge Time: 7:12 Pm ***   Original-Treating Physician or Chiropractor    Page 2-Insurer/TPA    Page 3-Employer    Page 4-Employee

## 2022-12-06 NOTE — LETTER
[address] [place] [address]  Phone:  [phone] - Fax:  [phone]   [facility] Progress Report and Disability Certification  Date of Service: 12/6/2022   No Show:  No  Date / Time of Next Visit: 12/9/2022   Claim Information   Patient Name: Fallon Richardson  Claim Number:     Employer: [employer]  Date of Injury: 12/6/2022     Insurer / TPA: [employer] ID / SSN:     Occupation: lead Diagnosis: The encounter diagnosis was Contusion of right forearm, initial encounter.    Medical Information   Related to Industrial Injury? Yes    Subjective Complaints:  DOI: 12/6/22. Got hit by forklift in right forearm today, causing pain. Tylenol did not help pain. She is right-handed.   Objective Findings: Tender to palpation in right forearm. No tenderness to palpation in right shoulder, elbow, wrist, and hand.   Pre-Existing Condition(s): None.   Assessment:   Initial Visit    Status: Additional Care Required  Comments:Return on 12/9/22 or sooner if needed.  Permanent Disability:No    Plan: Medication  Comments:See below.    Diagnostics: X-ray  Comments:Right forearm x-rays: No acute osseous abnormality.    Comments:  Warned of delayed bruising and swelling. Arm sling provided to wear on right arm as needed.    Toradol (Ketorolac) 30 mg injection given for anti-inflammatory effect. May take over-the-counter Acetaminophen (Tylenol) as needed for pain. May take over-the-counter Ibuprofen (Motrin or Advil) OR Naproxen (Aleve) as needed for pain and swelling for anti-inflammatory effect starting on 12/7/22.    Disability Information   Status: Released to Restricted Duty    From:  12/6/2022  Through: 12/9/2022 Restrictions are: Temporary   Physical Restrictions   Sitting:    Standing:    Stooping:    Bending:      Squatting:    Walking:    Climbing:    Pushing:      Pulling:    Other:    Reaching Above Shoulder (L):   Reaching  Above Shoulder (R):       Reaching Below Shoulder (L):    Reaching Below Shoulder (R):      Not to exceed Weight Limits   Carrying(hrs):   Weight Limit(lb):   Lifting(hrs):   Weight  Limit(lb):     Comments: No use of right arm. Wear arm sling on right arm as needed.    Repetitive Actions   Hands: i.e. Fine Manipulations from Grasping:     Feet: i.e. Operating Foot Controls:     Driving / Operate Machinery:     Health Care Provider’s Original or Electronic Signature  Mathew Amin M.D. Health Care Provider’s Original or Electronic Signature    Avni Artis DO MPH     Clinic Name / Location: [address] [address] Clinic Phone Number: Dept: [phone]   Appointment Time: 3:30 Pm Visit Start Time: 6:05 PM   Check-In Time:  3:25 Pm Visit Discharge Time: 7:12 Pm    Original-Treating Physician or Chiropractor    Page 2-Insurer/TPA    Page 3-Employer    Page 4-Employee

## 2022-12-06 NOTE — LETTER
"EMPLOYEE’S CLAIM FOR COMPENSATION/ REPORT OF INITIAL TREATMENT  FORM C-4    EMPLOYEE’S CLAIM - PROVIDE ALL INFORMATION REQUESTED   First Name  Fallon Last Name  Danna Richardson Birthdate                    1980                Sex  female Claim Number (Insurer’s Use Only)    Home Address  [address] Age  42 y.o. Height  1.549 m (5' 1\") Weight  64.9 kg (143 lb) [facility]     [facility] Zip  [address] Telephone  [phone] (home)    Mailing Address  [address] 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 (Be specific an answer in detail. 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  n/a Witnesses to the Accident  Lidia Elise      Nature of Injury or Occupational Disease  Contusion  Part(s) of Body Injured or Affected  Elbow (R), Lower Arm (R), Upper Arm (R)    I certify that the above is true " and correct to the best of my knowledge and that I have provided this information in order to obtain the benefits of [place]  I hereby authorize any physician, chiropractor, surgeon, practitioner, or other person, any hospital, including [facility] or [facility], any medical service organization, any insurance company, or other institution or organization to release to each other, any medical or other information, including benefits paid or payable, pertinent to this injury or disease, except information relative to diagnosis, treatment and/or counseling for AIDS, psychological conditions, alcohol or controlled substances, for which I must give specific authorization.  A Photostat of this authorization shall be as valid as the original.     Date 12/06/2022   Place [facility] Employee’s Original or  *Electronic Signature   THIS REPORT MUST BE COMPLETED AND MAILED WITHIN 3 WORKING DAYS OF TREATMENT   Place  [facility]  Name of Facility  [facility]   Date  12/6/2022 Diagnosis and Description of Injury or Occupational Disease  (S50.11XA) Contusion of right forearm, initial encounter Is there evidence the injured employee was under the influence of alcohol and/or another controlled substance at the time of accident?  ? No ? Yes (if yes, please explain)    Hour  6:05 PM   The encounter diagnosis was Contusion of right forearm, initial encounter. No   Treatment  Warned of delayed bruising and swelling. Arm sling provided to wear on right arm as needed. Toradol (Ketorolac) 30 mg injection given for anti-inflammatory effect. May take over-the-counter Acetaminophen (Tylenol) as needed for pain. May take over-the-counter Ibuprofen (Motrin or Advil) OR Naproxen (Aleve) as needed for pain and swelling for anti-inflammatory effect starting on 12/7/22.  Have you advised the patient  to remain off work five days or     more?    X-Ray Findings  Negative  Comments:Right forearm x-rays: No acute osseous abnormality.   ? Yes Indicate dates:   From   To      From information given by the employee, together with medical evidence, can        you directly connect this injury or occupational disease as job incurred?  Yes ? No If no, is the injured employee capable of:  ? full duty  No ? modified duty  Yes   Is additional medical care by a physician indicated?  Yes  Comments:Return on 12/9/22 or sooner if needed. If Modified Duty, Specify any Limitations / Restrictions  No use of right arm. Wear arm sling on right arm as needed.   Do you know of any previous injury or disease contributing to this condition or occupational disease?  ? Yes ? No (Explain if yes)                          No   Date  12/7/2022 Print Health Care Provider's   Mathew Amin M.D. I certify the employer’s copy of  this form was mailed on:   Address  [address] Insurer’s Use Only     [facility]  [address]    Provider’s Tax ID Number  [id number] Telephone  Dept: [phone]             Health Care Provider’s Original or Electronic Signature  e-MATHEW Saldaña M.D. Degree (MD,DO, DC,PA-C,APRN)   MD      * Complete and attach Release of Information (Form C-4A) when injured employee signs C-4 Form electronically  ORIGINAL - TREATING HEALTHCARE PROVIDER PAGE 2 - INSURER/TPA PAGE 3 - EMPLOYER PAGE 4 - EMPLOYEE             Form C-4 (rev.08/21)           BRIEF DESCRIPTION OF RIGHTS AND BENEFITS  (Pursuant to [place]RS 616C.050)    Notice of Injury or Occupational Disease (Incident Report Form C-1): If an injury or occupational disease (OD) arises out of and in the course of employment, you must provide written notice to your employer as soon as practicable, but no later than 7 days after the accident or OD. Your employer shall maintain a sufficient supply of the required forms.    Claim for Compensation (Form  "C-4): If medical treatment is sought, the form C-4 is available at the place of initial treatment. A completed \"Claim for Compensation\" (Form C-4) must be filed within 90 days after an accident or OD. The treating physician or chiropractor must, within 3 working days after treatment, complete and mail to the employer, the employer's insurer and third-party , the Claim for Compensation.    Medical Treatment: If you require medical treatment for your on-the-job injury or OD, you may be required to select a physician or chiropractor from a list provided by your workers’ compensation insurer, if it has contracted with an Organization for Managed Care (MCO) or Preferred Provider Organization (PPO) or providers of health care. If your employer has not entered into a contract with an MCO or PPO, you may select a physician or chiropractor from the Panel of Physicians and Chiropractors. Any medical costs related to your industrial injury or OD will be paid by your insurer.    Temporary Total Disability (TTD): If your doctor has certified that you are unable to work for a period of at least 5 consecutive days, or 5 cumulative days in a 20-day period, or places restrictions on you that your employer does not accommodate, you may be entitled to TTD compensation.    Temporary Partial Disability (TPD): If the wage you receive upon reemployment is less than the compensation for TTD to which you are entitled, the insurer may be required to pay you TPD compensation to make up the difference. TPD can only be paid for a maximum of 24 months.    Permanent Partial Disability (PPD): When your medical condition is stable and there is an indication of a PPD as a result of your injury or OD, within 30 days, your insurer must arrange for an evaluation by a rating physician or chiropractor to determine the degree of your PPD. The amount of your PPD award depends on the date of injury, the results of the PPD evaluation, your age " and wage.    Permanent Total Disability (PTD): If you are medically certified by a treating physician or chiropractor as permanently and totally disabled and have been granted a PTD status by your insurer, you are entitled to receive monthly benefits not to exceed 66 2/3% of your average monthly wage. The amount of your PTD payments is subject to reduction if you previously received a lump-sum PPD award.    Vocational Rehabilitation Services: You may be eligible for vocational rehabilitation services if you are unable to return to the job due to a permanent physical impairment or permanent restrictions as a result of your injury or occupational disease.    Transportation and Per Bharathi Reimbursement: You may be eligible for travel expenses and per bharathi associated with medical treatment.    Reopening: You may be able to reopen your claim if your condition worsens after claim closure.     Appeal Process: If you disagree with a written determination issued by the insurer or the insurer does not respond to your request, you may appeal to the Department of Administration, , by following the instructions contained in your determination letter. You must appeal the determination within 70 days from the date of the determination letter at [address], [address], [place], [place] [address], or [address] [facility] [address], [place] [address]. If you disagree with the  decision, you may appeal to the Department of Administration, . You must file your appeal within 30 days from the date of the  decision letter at [address], [address], [place], [place] [address], or [address], [address], [place] [address]. If you disagree with a decision of an , you may file a petition for judicial review with the District Court. You must do so within 30 days of the Appeal Officer’s decision. You may be represented by an   at your own expense or you may contact the [facility] for possible representation.    [place]  for Injured Workers (NAIW): If you disagree with a  decision, you may request that NAIW represent you without charge at an  Hearing. For information regarding denial of benefits, you may contact the [facility] at: [address], [address], [place] [address], [phone]) [phone], or [address], [address], [place] [address], [phone]) [phone]    To File a Complaint with the Division: If you wish to file a complaint with the  of the Division of Industrial Relations (DIR),  please contact the Workers’ Compensation Section, [address], [address], [place], [place] [address], telephone (132) [phone], or [address], [address], [place], [place] [address], telephone (720) [phone].    For assistance with Workers’ Compensation Issues: You may contact the [facility] Office for Consumer Health Assistance, [address], [address], [place], [place] [address], Toll Free 1-879.836.2455, Web site: http://[facility].[place].gov/Programs/SANCHEZ E-mail: [email or web address]              __________________________________________________________________                                    ___12/06/2022______            Employee Name / Signature                                                                                                                            Date                                                                                                                                                                                                                              D-2 (rev. 10/20)

## 2022-12-06 NOTE — LETTER
[address] [place] [address]  Phone:  [phone] - Fax:  [phone]   [facility] Progress Report and Disability Certification  Date of Service: 12/6/2022   No Show:  No  Date / Time of Next Visit: 12/9/2022   Claim Information   Patient Name: Fallon Richardson  Claim Number:     Employer: [employer]  Date of Injury: 12/6/2022     Insurer / TPA: [employer]  ID / SSN:     Occupation: lead  Diagnosis: The encounter diagnosis was Contusion of right forearm, initial encounter.    Medical Information   Related to Industrial Injury? Yes    Subjective Complaints:  DOI: 12/6/22. Got hit by forklift in right forearm today, causing pain. Tylenol did not help pain. She is right-handed.   Objective Findings: Tender to palpation in right forearm. No tenderness to palpation in right shoulder, elbow, wrist, and hand.   Pre-Existing Condition(s): None.   Assessment:   Initial Visit    Status: Additional Care Required  Comments:Return on 12/9/22 or sooner if needed.  Permanent Disability:No    Plan: Medication  Comments:See below.    Diagnostics: X-ray  Comments:Right forearm x-rays: No acute osseous abnormality.    Comments:  Warned of delayed bruising and swelling. Arm sling provided to wear on right arm as needed.    Toradol (Ketorolac) 30 mg injection given for anti-inflammatory effect. May take over-the-counter Acetaminophen (Tylenol) as needed for pain. May take over-the-counter Ibuprofen (Motrin or Advil) OR Naproxen (Aleve) as needed for pain and swelling for anti-inflammatory effect starting on 12/7/22.    Disability Information   Status: Released to Restricted Duty    From:  12/6/2022  Through: 12/9/2022 Restrictions are: Temporary   Physical Restrictions   Sitting:    Standing:    Stooping:    Bending:      Squatting:    Walking:    Climbing:    Pushing:      Pulling:    Other:    Reaching Above Shoulder  (L):   Reaching Above Shoulder (R):       Reaching Below Shoulder (L):    Reaching Below Shoulder (R):      Not to exceed Weight Limits   Carrying(hrs):   Weight Limit(lb):   Lifting(hrs):   Weight  Limit(lb):     Comments: No use of right arm. Wear arm sling on right arm as needed.    Repetitive Actions   Hands: i.e. Fine Manipulations from Grasping:     Feet: i.e. Operating Foot Controls:     Driving / Operate Machinery:     Health Care Provider’s Original or Electronic Signature  Mathew Amin M.D. Health Care Provider’s Original or Electronic Signature    Avni Artis DO MPH     Clinic Name / Location: [address] [address] Clinic Phone Number: Dept: [phone]   Appointment Time: 3:30 Pm Visit Start Time: 6:05 PM   Check-In Time:  3:25 Pm Visit Discharge Time: 7:05 PM   Original-Treating Physician or Chiropractor    Page 2-Insurer/TPA    Page 3-Employer    Page 4-Employee

## 2022-12-07 ENCOUNTER — NON-PROVIDER VISIT (OUTPATIENT)
Dept: URGENT CARE | Facility: PHYSICIAN GROUP | Age: 42
End: 2022-12-07
Payer: COMMERCIAL

## 2022-12-07 DIAGNOSIS — Z02.1 PRE-EMPLOYMENT DRUG SCREENING: ICD-10-CM

## 2022-12-07 LAB
AMP AMPHETAMINE: NORMAL
BAR BARBITURATES: NORMAL
BREATH ALCOHOL COMMENT: NORMAL
BZO BENZODIAZEPINES: NORMAL
COC COCAINE: NORMAL
INT CON NEG: NORMAL
INT CON POS: NORMAL
MDMA ECSTASY: NORMAL
MET METHAMPHETAMINES: NORMAL
MTD METHADONE: NORMAL
OPI OPIATES: NORMAL
OXY OXYCODONE: NORMAL
PCP PHENCYCLIDINE: NORMAL
POC BREATHALIZER: 0 PERCENT (ref 0–0.01)
POC URINE DRUG SCREEN OCDRS: NORMAL
THC: NORMAL

## 2022-12-07 PROCEDURE — 82075 ASSAY OF BREATH ETHANOL: CPT | Performed by: NURSE PRACTITIONER

## 2022-12-07 PROCEDURE — 80305 DRUG TEST PRSMV DIR OPT OBS: CPT | Performed by: NURSE PRACTITIONER

## 2022-12-07 NOTE — PROGRESS NOTES
"Chief Complaint:    Chief Complaint   Patient presents with    Arm Injury     New W/C right arm injury.        History of Present Illness:    DOI: 12/6/22. Got hit by forklift in right forearm today, causing pain. Tylenol did not help pain. She is right-handed.      Past Medical History:    Past Medical History:   Diagnosis Date    Headache(784.0)      Past Surgical History:    History reviewed. No pertinent surgical history.    Social History:    Social History     Socioeconomic History    Marital status:      Spouse name: Not on file    Number of children: Not on file    Years of education: Not on file    Highest education level: Not on file   Occupational History    Not on file   Tobacco Use    Smoking status: Never    Smokeless tobacco: Never   Substance and Sexual Activity    Alcohol use: No    Drug use: No    Sexual activity: Not on file   Other Topics Concern    Not on file   Social History Narrative    Not on file     Social Determinants of Health     Financial Resource Strain: Not on file   Food Insecurity: Not on file   Transportation Needs: Not on file   Physical Activity: Not on file   Stress: Not on file   Social Connections: Not on file   Intimate Partner Violence: Not on file   Housing Stability: Not on file     Family History:    Family History   Problem Relation Age of Onset    Diabetes Mother     Hyperlipidemia Mother     Cancer Maternal Grandmother     Cancer Maternal Grandfather      Medications:    No current outpatient medications on file prior to visit.     No current facility-administered medications on file prior to visit.     Allergies:    No Known Allergies      Vitals:    Vitals:    12/06/22 1806   BP: 138/70   Pulse: 85   Resp: 16   Temp: 36.5 °C (97.7 °F)   TempSrc: Temporal   SpO2: 100%   Weight: 64.9 kg (143 lb)   Height: 1.549 m (5' 1\")       Physical Exam:    Constitutional: Vital signs reviewed. Appears well-developed and well-nourished. No acute distress.   Eyes: Sclera " white, conjunctivae clear.  ENT: External ears normal. Hearing normal.  Cardiovascular: Peripheral pulses 2+.   Pulmonary/Chest: Respirations non-labored.  Musculoskeletal: Tender to palpation in right forearm. No tenderness to palpation in right shoulder, elbow, wrist, and hand.  Neurological: Alert and oriented to person, place, and time. Muscle tone normal. Coordination normal. Light touch and sensation normal.   Skin: No rashes or lesions. Warm, dry, normal turgor.  Psychiatric: Normal mood and affect. Behavior is normal. Judgment and thought content normal.       Diagnostics:    DX-FOREARM RIGHT  Order: [id number]  Status: Final result     Visible to patient: No (scheduled for 2022  4:43 PM)     Next appt: None     Dx: Contusion of right forearm, initial e...     0 Result Notes  Details    Reading Physician Reading Date Result Priority   Chris Rocha M.D.  [phone] 2022 Urgent Care     Narrative & Impression     2022 6:34 PM     HISTORY/REASON FOR EXAM:  Pain/Deformity Following Trauma; Room 4. Hit in right forearm today by forklift..        TECHNIQUE/EXAM DESCRIPTION AND NUMBER OF VIEWS:  2 views of the RIGHT forearm.     COMPARISON: None     FINDINGS:  There is no fracture or dislocation.  The visualized osseous structures are in anatomic alignment.  The joint spaces are grossly preserved.  Bone mineralization is age-appropriate..        IMPRESSION:     No acute osseous abnormality.     I personally reviewed the images. Images and Rad report reviewed with her and copy of report to her.       Medical Decision Makin. Contusion of right forearm, initial encounter  - DX-FOREARM RIGHT; Future  - ketorolac (TORADOL) injection 30 mg       Discussed with her DDX, management options, and risks, benefits, and alternatives to treatment plan agreed upon.    DOI: 22. Got hit by Turbo-Trac USAlift in right forearm today, causing pain. Tylenol did not help pain. She is right-handed.    Tender to  palpation in right forearm. No tenderness to palpation in right shoulder, elbow, wrist, and hand.    Right forearm x-rays: No acute osseous abnormality.    Warned of delayed bruising and swelling. Arm sling provided to wear on right arm as needed.    Work restrictions: No use of right arm. Wear arm sling on right arm as needed.    Toradol (Ketorolac) 30 mg injection given for anti-inflammatory effect.     May take over-the-counter Acetaminophen (Tylenol) as needed for pain    May take over-the-counter Ibuprofen (Motrin or Advil) OR Naproxen (Aleve) as needed for pain and swelling for anti-inflammatory effect starting on 12/7/22.    Return on 12/9/22 or sooner if needed.

## 2022-12-10 ENCOUNTER — OCCUPATIONAL MEDICINE (OUTPATIENT)
Dept: URGENT CARE | Facility: PHYSICIAN GROUP | Age: 42
End: 2022-12-10
Payer: COMMERCIAL

## 2022-12-10 VITALS
OXYGEN SATURATION: 100 % | WEIGHT: 143 LBS | DIASTOLIC BLOOD PRESSURE: 68 MMHG | HEIGHT: 61 IN | RESPIRATION RATE: 16 BRPM | TEMPERATURE: 98.2 F | BODY MASS INDEX: 27 KG/M2 | HEART RATE: 85 BPM | SYSTOLIC BLOOD PRESSURE: 112 MMHG

## 2022-12-10 DIAGNOSIS — S50.11XD CONTUSION OF RIGHT FOREARM, SUBSEQUENT ENCOUNTER: ICD-10-CM

## 2022-12-10 PROCEDURE — 99213 OFFICE O/P EST LOW 20 MIN: CPT | Performed by: NURSE PRACTITIONER

## 2022-12-10 ASSESSMENT — ENCOUNTER SYMPTOMS
NEUROLOGICAL NEGATIVE: 1
CONSTITUTIONAL NEGATIVE: 1

## 2022-12-10 ASSESSMENT — VISUAL ACUITY: OU: 1

## 2022-12-10 ASSESSMENT — PAIN SCALES - GENERAL: PAINLEVEL: NO PAIN

## 2022-12-10 NOTE — LETTER
"   [facility]  [address] [place] [address] [place] [address]  Phone:  [phone] - Fax:  [phone]   [facility] Progress Report and Disability Certification  Date of Service: 12/10/2022   No Show:  No  Date / Time of Next Visit: 12/17/2022 9AM   Claim Information   Patient Name: Fallon Richardson  Claim Number:     Employer: [employer]  Date of Injury: 12/6/2022     Insurer / TPA: [employer]  ID / SSN:     Occupation: lead  Diagnosis: The encounter diagnosis was Contusion of right forearm, subsequent encounter.    Medical Information   Related to Industrial Injury? Yes    Subjective Complaints:  Initial UC visit 12/6/2022 reviewed for continuity of care:    \"DOI: 12/6/22. Got hit by forklift in right forearm today, causing pain. Tylenol did not help pain. She is right-handed.\"    XR of RFA normal. Dx: contusion. Tx: sling as needed. Restrictions. Toradol 30 mg IM. OTC meds as needed.    Follow-up UC visit today 12/10/2022:    Daughter present who is helping to translate in Northern [place].  Patient reports symptoms improving overall.  Can move her hand more.  Still has localized tenderness at her middle right forearm.  No other/new sx.   Objective Findings: Constitutional:       General: She is not in acute distress.     Appearance: She is well-developed. She is not ill-appearing or toxic-appearing.   Musculoskeletal:         General: No deformity. Normal range of motion.      Right forearm: Swelling (Mild, localized at mid dorsal aspect) and tenderness (Mild, localized at mid dorsal aspect) present. No deformity or lacerations.      Right wrist: Normal range of motion.      Right hand: Normal range of motion.   Skin:     General: Skin is warm and dry.      Coloration: Skin is not pale.   Neurological:      Mental Status: She is alert and oriented to person, place, and time.      Motor: No weakness.    Pre-Existing Condition(s):     Assessment:   " Condition Improved    Status: Additional Care Required  Permanent Disability:No    Plan:      Diagnostics:      Comments:  Symptoms improving overall.  Update work restrictions.  Continue previous therapy except stop use of sling.  Follow-up in 1 week.  Sooner if needed.    Disability Information   Status: Released to Restricted Duty    From:  12/10/2022  Through: 12/17/2022 Restrictions are: Temporary   Physical Restrictions   Sitting:    Standing:    Stooping:    Bending:      Squatting:    Walking:    Climbing:    Pushing:      Pulling:    Other:    Reaching Above Shoulder (L):   Reaching Above Shoulder (R):       Reaching Below Shoulder (L):    Reaching Below Shoulder (R):      Not to exceed Weight Limits   Carrying(hrs):   Weight Limit(lb):   Lifting(hrs):   Weight  Limit(lb):     Comments: Pushing pulling lifting with right upper extremity limited to 5 pounds    Repetitive Actions   Hands: i.e. Fine Manipulations from Grasping:     Feet: i.e. Operating Foot Controls:     Driving / Operate Machinery:     Health Care Provider’s Original or Electronic Signature  YANE WhyteROlenaNOlena Health Care Provider’s Original or Electronic Signature    Avni Artis DO MPH     Clinic Name / Location: [address] [address] Clinic Phone Number: Dept: [phone]   Appointment Time: 9:00 Am Visit Start Time: 9:11 AM   Check-In Time:  8:52 Am Visit Discharge Time: 9:52 AM   Original-Treating Physician or Chiropractor    Page 2-Insurer/TPA    Page 3-Employer    Page 4-Employee

## 2022-12-10 NOTE — PROGRESS NOTES
"Subjective:     Fallon Richardson is a 42 y.o. female who presents for Work-Related Injury (Wc fv )    Initial UC visit 12/6/2022 reviewed for continuity of care:    \"DOI: 12/6/22. Got hit by forklift in right forearm today, causing pain. Tylenol did not help pain. She is right-handed.\"    XR of RFA normal. Dx: contusion. Tx: sling as needed. Restrictions. Toradol 30 mg IM. OTC meds as needed.    Follow-up UC visit today 12/10/2022:    Daughter present who is helping to translate in Hebrew.  Patient reports symptoms improving overall.  Can move her hand more.  Still has localized tenderness at her middle right forearm.  No other/new sx.    Review of Systems   Constitutional: Negative.    Musculoskeletal:         RFA pain improving   Neurological: Negative.    All other systems reviewed and are negative.    Refer to HPI for additional details.    During this visit, appropriate PPE was worn, hand hygiene was performed, and the patient and any visitors were masked.    PMH: No pertinent past medical history to this problem  MEDS: Medications were reviewed in Epic  ALLERGIES: Allergies were reviewed in Epic  FH: No pertinent family history to this problem      Objective:     /68   Pulse 85   Temp 36.8 °C (98.2 °F) (Temporal)   Resp 16   Ht 1.549 m (5' 1\")   Wt 64.9 kg (143 lb)   SpO2 100%   BMI 27.02 kg/m²     Physical Exam  Nursing note reviewed.   Constitutional:       General: She is not in acute distress.     Appearance: She is well-developed. She is not ill-appearing or toxic-appearing.   Eyes:      General: Vision grossly intact.   Cardiovascular:      Rate and Rhythm: Normal rate.      Pulses: Normal pulses.   Pulmonary:      Effort: Pulmonary effort is normal. No respiratory distress.   Musculoskeletal:         General: No deformity. Normal range of motion.      Right forearm: Swelling (Mild, localized at mid dorsal aspect) and tenderness (Mild, localized at mid dorsal aspect) present. No " deformity or lacerations.      Right wrist: Normal range of motion.      Right hand: Normal range of motion.   Skin:     General: Skin is warm and dry.      Coloration: Skin is not pale.   Neurological:      Mental Status: She is alert and oriented to person, place, and time.      Motor: No weakness.   Psychiatric:         Behavior: Behavior normal. Behavior is cooperative.       Assessment/Plan:     1. Contusion of right forearm, subsequent encounter    Symptoms improving overall.  Update work restrictions.  Continue previous therapy except stop use of sling.  Follow-up in 1 week.  Sooner if needed.    Differential diagnosis, natural history, supportive care, over-the-counter symptom management per 's instructions, close monitoring, and indications for immediate follow-up discussed.     All questions answered. Patient agrees with the plan of care.

## 2022-12-17 ENCOUNTER — OCCUPATIONAL MEDICINE (OUTPATIENT)
Dept: URGENT CARE | Facility: PHYSICIAN GROUP | Age: 42
End: 2022-12-17
Payer: COMMERCIAL

## 2022-12-17 VITALS
BODY MASS INDEX: 26.62 KG/M2 | OXYGEN SATURATION: 99 % | SYSTOLIC BLOOD PRESSURE: 108 MMHG | HEIGHT: 61 IN | RESPIRATION RATE: 15 BRPM | WEIGHT: 141 LBS | HEART RATE: 105 BPM | DIASTOLIC BLOOD PRESSURE: 60 MMHG | TEMPERATURE: 98.6 F

## 2022-12-17 DIAGNOSIS — S50.11XD CONTUSION OF RIGHT FOREARM, SUBSEQUENT ENCOUNTER: ICD-10-CM

## 2022-12-17 PROCEDURE — 99213 OFFICE O/P EST LOW 20 MIN: CPT | Performed by: PHYSICIAN ASSISTANT

## 2022-12-17 ASSESSMENT — ENCOUNTER SYMPTOMS
FEVER: 0
CHILLS: 0
COUGH: 0
VOMITING: 0
NAUSEA: 1
SENSORY CHANGE: 0
TINGLING: 0
FOCAL WEAKNESS: 0

## 2022-12-17 NOTE — LETTER
[address] [place] [address]  Phone:  [phone] - Fax:  [phone]   [facility] Progress Report and Disability Certification  Date of Service: 12/17/2022   No Show:  No  Date / Time of Next Visit: 12/27/2022   Claim Information   Patient Name: Fallon Richardson  Claim Number:     Employer: [employer] Date of Injury: 12/6/2022     Insurer / TPA: [facility] ID / SSN:     Occupation: lead Diagnosis: The encounter diagnosis was Contusion of right forearm, subsequent encounter.    Medical Information   Related to Industrial Injury? Yes   Subjective Complaints:  DOI: Patient is here to follow-up on her right forearm contusion. This is visit #3. Her daughter is here to interpret. She reports significant improvement since last visit. She has less pain and more range of motion without discomfort. She does have some pain with certain movements and localized tenderness with palpation. She does not feel she could tolerate full duty at work. She denies numbness or tingling.   Objective Findings: Vitals reviewed.  Right forearm: FROM. Local TTP over dorsal and ulnar aspect of forearm. Mild localized edema over this area. Skin intact. Distal n/v intact.    Pre-Existing Condition(s):     Assessment:   Condition Improved    Status: Additional Care Required  Permanent Disability:No    Plan:   Comments:RICE. OTC analgesics. Continue Current Restrictions. RTC 10 days.    Diagnostics:      Comments:       Disability Information   Status: Released to Restricted Duty    From:  12/17/2022  Through: 12/27/2022 Restrictions are: Temporary   Physical Restrictions   Sitting:    Standing:    Stooping:    Bending:      Squatting:    Walking:    Climbing:    Pushing:      Pulling:    Other:    Reaching Above Shoulder (L):   Reaching Above Shoulder (R):       Reaching Below Shoulder (L):    Reaching Below Shoulder (R):      Not to exceed Weight  Limits   Carrying(hrs):   Weight Limit(lb):   Lifting(hrs):   Weight  Limit(lb):     Comments: Pushing pulling lifting with right upper extremity limited to 5 lbs.     Repetitive Actions   Hands: i.e. Fine Manipulations from Grasping:     Feet: i.e. Operating Foot Controls:     Driving / Operate Machinery:     Health Care Provider’s Original or Electronic Signature  Swati Renteria P.A.-C. Health Care Provider’s Original or Electronic Signature    Avni Artis DO MPH     Clinic Name / Location: [address] [address] Clinic Phone Number: Dept: [phone]   Appointment Time: 9:00 Am Visit Start Time: 9:03 AM   Check-In Time:  8:45 Am Visit Discharge Time: 9:15 AM   Original-Treating Physician or Chiropractor    Page 2-Insurer/TPA    Page 3-Employer    Page 4-Employee

## 2022-12-17 NOTE — PROGRESS NOTES
"Subjective     Fallon Richardson is a 42 y.o. female who presents with Follow-Up (/c fv arm injury. Feeling better but  to the touch. )      DOI: Patient is here to follow-up on her right forearm contusion. This is visit #3. Her daughter is here to interpret. She reports significant improvement since last visit. She has less pain and more range of motion without discomfort. She does have some pain with certain movements and localized tenderness with palpation. She does not feel she could tolerate full duty at work. She denies numbness or tingling.     HPI      Past Medical History:   Diagnosis Date    Headache(784.0)          No past surgical history on file.        Family History   Problem Relation Age of Onset    Diabetes Mother     Hyperlipidemia Mother     Cancer Maternal Grandmother     Cancer Maternal Grandfather      Patient has no known allergies.        Medications, Allergies, and current problem list reviewed today in Epic    Review of Systems   Constitutional:  Negative for chills and fever.   Respiratory:  Negative for cough.    Gastrointestinal:  Positive for nausea. Negative for vomiting.   Musculoskeletal:  Positive for joint pain.   Skin:  Negative for rash.   Neurological:  Negative for tingling, sensory change and focal weakness.      All other systems reviewed and are negative.         Objective     /60   Pulse (!) 105   Temp 37 °C (98.6 °F) (Temporal)   Resp 15   Ht 1.549 m (5' 1\")   Wt 64 kg (141 lb)   SpO2 99%   BMI 26.64 kg/m²      Physical Exam  Constitutional:       General: She is not in acute distress.     Appearance: She is not ill-appearing.   HENT:      Head: Normocephalic and atraumatic.   Eyes:      Conjunctiva/sclera: Conjunctivae normal.   Cardiovascular:      Rate and Rhythm: Normal rate and regular rhythm.   Pulmonary:      Effort: Pulmonary effort is normal. No respiratory distress.      Breath sounds: No stridor. No wheezing.   Skin:     General: " Skin is warm and dry.      Findings: No rash.   Neurological:      General: No focal deficit present.      Mental Status: She is alert and oriented to person, place, and time.   Psychiatric:         Mood and Affect: Mood normal.         Behavior: Behavior normal.         Thought Content: Thought content normal.         Judgment: Judgment normal.       Vitals reviewed.  Right forearm: FROM. Local TTP over dorsal and ulnar aspect of forearm. Mild localized edema over this area. Skin intact. Distal n/v intact.                    Assessment & Plan        1. Contusion of right forearm, subsequent encounter  Continue current restrictions.  RICE  RTC 10 days- hopefully MMI at that time- Or referral to [facility] if not    Differential diagnoses, Supportive care, and indications for immediate follow-up discussed with patient.   Pathogenesis of diagnosis discussed including typical length and natural progression.   Instructed to return to clinic or nearest emergency department for any change in condition, further concerns, or worsening of symptoms.        The patient demonstrated a good understanding and agreed with the treatment plan.      Swati Renteria P.A.-C.

## 2023-02-16 ENCOUNTER — APPOINTMENT (OUTPATIENT)
Dept: URGENT CARE | Facility: PHYSICIAN GROUP | Age: 43
End: 2023-02-16
Payer: COMMERCIAL

## 2023-09-20 ENCOUNTER — HOSPITAL ENCOUNTER (OUTPATIENT)
Facility: MEDICAL CENTER | Age: 43
End: 2023-09-22
Attending: STUDENT IN AN ORGANIZED HEALTH CARE EDUCATION/TRAINING PROGRAM | Admitting: INTERNAL MEDICINE
Payer: COMMERCIAL

## 2023-09-20 DIAGNOSIS — D64.9 ANEMIA, UNSPECIFIED TYPE: ICD-10-CM

## 2023-09-20 DIAGNOSIS — K21.9 GASTROESOPHAGEAL REFLUX DISEASE, UNSPECIFIED WHETHER ESOPHAGITIS PRESENT: ICD-10-CM

## 2023-09-20 PROBLEM — R07.9 CHEST PAIN: Status: ACTIVE | Noted: 2023-09-20

## 2023-09-20 PROCEDURE — A9270 NON-COVERED ITEM OR SERVICE: HCPCS | Performed by: INTERNAL MEDICINE

## 2023-09-20 PROCEDURE — 93005 ELECTROCARDIOGRAM TRACING: CPT | Performed by: INTERNAL MEDICINE

## 2023-09-20 PROCEDURE — 700102 HCHG RX REV CODE 250 W/ 637 OVERRIDE(OP): Performed by: INTERNAL MEDICINE

## 2023-09-20 PROCEDURE — G0378 HOSPITAL OBSERVATION PER HR: HCPCS

## 2023-09-20 PROCEDURE — 99223 1ST HOSP IP/OBS HIGH 75: CPT | Performed by: INTERNAL MEDICINE

## 2023-09-20 RX ORDER — AMINOPHYLLINE 25 MG/ML
100 INJECTION, SOLUTION INTRAVENOUS
Status: COMPLETED | OUTPATIENT
Start: 2023-09-20 | End: 2023-09-21

## 2023-09-20 RX ORDER — LABETALOL HYDROCHLORIDE 5 MG/ML
10 INJECTION, SOLUTION INTRAVENOUS EVERY 4 HOURS PRN
Status: DISCONTINUED | OUTPATIENT
Start: 2023-09-20 | End: 2023-09-22 | Stop reason: HOSPADM

## 2023-09-20 RX ORDER — PROMETHAZINE HYDROCHLORIDE 25 MG/1
12.5-25 TABLET ORAL EVERY 4 HOURS PRN
Status: DISCONTINUED | OUTPATIENT
Start: 2023-09-20 | End: 2023-09-22 | Stop reason: HOSPADM

## 2023-09-20 RX ORDER — ONDANSETRON 2 MG/ML
4 INJECTION INTRAMUSCULAR; INTRAVENOUS EVERY 4 HOURS PRN
Status: DISCONTINUED | OUTPATIENT
Start: 2023-09-20 | End: 2023-09-22 | Stop reason: HOSPADM

## 2023-09-20 RX ORDER — ASPIRIN 325 MG
325 TABLET ORAL DAILY
Status: DISCONTINUED | OUTPATIENT
Start: 2023-09-21 | End: 2023-09-21

## 2023-09-20 RX ORDER — BISACODYL 10 MG
10 SUPPOSITORY, RECTAL RECTAL
Status: DISCONTINUED | OUTPATIENT
Start: 2023-09-20 | End: 2023-09-22 | Stop reason: HOSPADM

## 2023-09-20 RX ORDER — NITROGLYCERIN 0.4 MG/1
0.4 TABLET SUBLINGUAL
Status: DISCONTINUED | OUTPATIENT
Start: 2023-09-20 | End: 2023-09-22 | Stop reason: HOSPADM

## 2023-09-20 RX ORDER — ASPIRIN 300 MG/1
300 SUPPOSITORY RECTAL DAILY
Status: DISCONTINUED | OUTPATIENT
Start: 2023-09-21 | End: 2023-09-21

## 2023-09-20 RX ORDER — AMOXICILLIN 250 MG
2 CAPSULE ORAL 2 TIMES DAILY
Status: DISCONTINUED | OUTPATIENT
Start: 2023-09-20 | End: 2023-09-22 | Stop reason: HOSPADM

## 2023-09-20 RX ORDER — POLYETHYLENE GLYCOL 3350 17 G/17G
1 POWDER, FOR SOLUTION ORAL
Status: DISCONTINUED | OUTPATIENT
Start: 2023-09-20 | End: 2023-09-22 | Stop reason: HOSPADM

## 2023-09-20 RX ORDER — ACETAMINOPHEN 325 MG/1
650 TABLET ORAL EVERY 6 HOURS PRN
Status: DISCONTINUED | OUTPATIENT
Start: 2023-09-20 | End: 2023-09-22 | Stop reason: HOSPADM

## 2023-09-20 RX ORDER — PROCHLORPERAZINE EDISYLATE 5 MG/ML
5-10 INJECTION INTRAMUSCULAR; INTRAVENOUS EVERY 4 HOURS PRN
Status: DISCONTINUED | OUTPATIENT
Start: 2023-09-20 | End: 2023-09-22 | Stop reason: HOSPADM

## 2023-09-20 RX ORDER — REGADENOSON 0.08 MG/ML
0.4 INJECTION, SOLUTION INTRAVENOUS
Status: COMPLETED | OUTPATIENT
Start: 2023-09-20 | End: 2023-09-21

## 2023-09-20 RX ORDER — PROMETHAZINE HYDROCHLORIDE 12.5 MG/1
12.5-25 SUPPOSITORY RECTAL EVERY 4 HOURS PRN
Status: DISCONTINUED | OUTPATIENT
Start: 2023-09-20 | End: 2023-09-22 | Stop reason: HOSPADM

## 2023-09-20 RX ORDER — ASPIRIN 81 MG/1
324 TABLET, CHEWABLE ORAL DAILY
Status: DISCONTINUED | OUTPATIENT
Start: 2023-09-21 | End: 2023-09-21

## 2023-09-20 RX ORDER — ONDANSETRON 4 MG/1
4 TABLET, ORALLY DISINTEGRATING ORAL EVERY 4 HOURS PRN
Status: DISCONTINUED | OUTPATIENT
Start: 2023-09-20 | End: 2023-09-22 | Stop reason: HOSPADM

## 2023-09-20 RX ORDER — ENOXAPARIN SODIUM 100 MG/ML
40 INJECTION SUBCUTANEOUS DAILY
Status: DISCONTINUED | OUTPATIENT
Start: 2023-09-21 | End: 2023-09-22 | Stop reason: HOSPADM

## 2023-09-20 RX ADMIN — SENNOSIDES AND DOCUSATE SODIUM 2 TABLET: 50; 8.6 TABLET ORAL at 23:39

## 2023-09-20 ASSESSMENT — COGNITIVE AND FUNCTIONAL STATUS - GENERAL
HELP NEEDED FOR BATHING: A LITTLE
WALKING IN HOSPITAL ROOM: A LITTLE
MOVING TO AND FROM BED TO CHAIR: A LITTLE
STANDING UP FROM CHAIR USING ARMS: A LITTLE
MOVING FROM LYING ON BACK TO SITTING ON SIDE OF FLAT BED: A LITTLE
DAILY ACTIVITIY SCORE: 23
SUGGESTED CMS G CODE MODIFIER DAILY ACTIVITY: CI
SUGGESTED CMS G CODE MODIFIER MOBILITY: CK
MOBILITY SCORE: 19
CLIMB 3 TO 5 STEPS WITH RAILING: A LITTLE

## 2023-09-20 ASSESSMENT — LIFESTYLE VARIABLES
TOTAL SCORE: 0
HAVE YOU EVER FELT YOU SHOULD CUT DOWN ON YOUR DRINKING: NO
ALCOHOL_USE: NO
HOW MANY TIMES IN THE PAST YEAR HAVE YOU HAD 5 OR MORE DRINKS IN A DAY: 0
CONSUMPTION TOTAL: NEGATIVE
TOTAL SCORE: 0
AVERAGE NUMBER OF DAYS PER WEEK YOU HAVE A DRINK CONTAINING ALCOHOL: 0
ON A TYPICAL DAY WHEN YOU DRINK ALCOHOL HOW MANY DRINKS DO YOU HAVE: 0
EVER HAD A DRINK FIRST THING IN THE MORNING TO STEADY YOUR NERVES TO GET RID OF A HANGOVER: NO
TOTAL SCORE: 0
EVER FELT BAD OR GUILTY ABOUT YOUR DRINKING: NO
HAVE PEOPLE ANNOYED YOU BY CRITICIZING YOUR DRINKING: NO

## 2023-09-20 ASSESSMENT — PATIENT HEALTH QUESTIONNAIRE - PHQ9
SUM OF ALL RESPONSES TO PHQ9 QUESTIONS 1 AND 2: 0
1. LITTLE INTEREST OR PLEASURE IN DOING THINGS: NOT AT ALL
2. FEELING DOWN, DEPRESSED, IRRITABLE, OR HOPELESS: NOT AT ALL

## 2023-09-20 NOTE — PROGRESS NOTES
"[facility] TRIAGE OFFICER DIRECT ADMISSION REPORT         - I spoke and discussed the case with the ER physician, Dr. Cuadra  - Chief complaint: Chest pain   - Pertinent history & patient course: 43-year-old female without any significant past medical history who comes into the hospital for acute onset of chest pain.  Chest pain exertional and is relieved by nitroglycerin.  EKG has possible ST depressions in V2 to be 3.  Troponins are negative x2.  [facility] and a possible cardiology consult.  Lovenox was not started.                      Please inform the \"Triage Coord.-RTOC\" through Voalte upon arrival of the patient to [facility] for assignment of a hospitalist to perform admission. Reach out to the assigned hospitalist (assigned by RTOC) for any questions or concerns regarding the care of this patient.          "

## 2023-09-21 ENCOUNTER — APPOINTMENT (OUTPATIENT)
Dept: CARDIOLOGY | Facility: MEDICAL CENTER | Age: 43
End: 2023-09-21
Attending: INTERNAL MEDICINE
Payer: COMMERCIAL

## 2023-09-21 ENCOUNTER — APPOINTMENT (OUTPATIENT)
Dept: RADIOLOGY | Facility: MEDICAL CENTER | Age: 43
End: 2023-09-21
Attending: INTERNAL MEDICINE

## 2023-09-21 PROBLEM — D64.9 ANEMIA: Status: ACTIVE | Noted: 2023-09-21

## 2023-09-21 PROBLEM — D72.829 LEUKOCYTOSIS: Status: ACTIVE | Noted: 2023-09-21

## 2023-09-21 PROBLEM — R73.9 HYPERGLYCEMIA: Status: ACTIVE | Noted: 2023-09-21

## 2023-09-21 LAB
ALBUMIN SERPL BCP-MCNC: 3.9 G/DL (ref 3.2–4.9)
ALBUMIN/GLOB SERPL: 1.2 G/DL
ALP SERPL-CCNC: 77 U/L (ref 30–99)
ALT SERPL-CCNC: 17 U/L (ref 2–50)
ANION GAP SERPL CALC-SCNC: 11 MMOL/L (ref 7–16)
AST SERPL-CCNC: 17 U/L (ref 12–45)
BASOPHILS # BLD AUTO: 0.6 % (ref 0–1.8)
BASOPHILS # BLD: 0.07 K/UL (ref 0–0.12)
BILIRUB SERPL-MCNC: 0.3 MG/DL (ref 0.1–1.5)
BUN SERPL-MCNC: 15 MG/DL (ref 8–22)
CALCIUM ALBUM COR SERPL-MCNC: 8.3 MG/DL (ref 8.5–10.5)
CALCIUM SERPL-MCNC: 8.2 MG/DL (ref 8.5–10.5)
CHLORIDE SERPL-SCNC: 104 MMOL/L (ref 96–112)
CHOLEST SERPL-MCNC: 146 MG/DL (ref 100–199)
CO2 SERPL-SCNC: 24 MMOL/L (ref 20–33)
CREAT SERPL-MCNC: 0.48 MG/DL (ref 0.5–1.4)
EKG IMPRESSION: NORMAL
EKG IMPRESSION: NORMAL
EOSINOPHIL # BLD AUTO: 0.25 K/UL (ref 0–0.51)
EOSINOPHIL NFR BLD: 2 % (ref 0–6.9)
ERYTHROCYTE [DISTWIDTH] IN BLOOD BY AUTOMATED COUNT: 42.8 FL (ref 35.9–50)
EST. AVERAGE GLUCOSE BLD GHB EST-MCNC: 123 MG/DL
GFR SERPLBLD CREATININE-BSD FMLA CKD-EPI: 120 ML/MIN/1.73 M 2
GLOBULIN SER CALC-MCNC: 3.3 G/DL (ref 1.9–3.5)
GLUCOSE SERPL-MCNC: 104 MG/DL (ref 65–99)
HBA1C MFR BLD: 5.9 % (ref 4–5.6)
HCT VFR BLD AUTO: 33.8 % (ref 37–47)
HDLC SERPL-MCNC: 29 MG/DL
HGB BLD-MCNC: 10.6 G/DL (ref 12–16)
IMM GRANULOCYTES # BLD AUTO: 0.05 K/UL (ref 0–0.11)
IMM GRANULOCYTES NFR BLD AUTO: 0.4 % (ref 0–0.9)
LDLC SERPL CALC-MCNC: 89 MG/DL
LV EJECT FRACT  99904: 65
LYMPHOCYTES # BLD AUTO: 3.3 K/UL (ref 1–4.8)
LYMPHOCYTES NFR BLD: 26.8 % (ref 22–41)
MCH RBC QN AUTO: 23.5 PG (ref 27–33)
MCHC RBC AUTO-ENTMCNC: 31.4 G/DL (ref 32.2–35.5)
MCV RBC AUTO: 74.8 FL (ref 81.4–97.8)
MONOCYTES # BLD AUTO: 0.88 K/UL (ref 0–0.85)
MONOCYTES NFR BLD AUTO: 7.1 % (ref 0–13.4)
NEUTROPHILS # BLD AUTO: 7.78 K/UL (ref 1.82–7.42)
NEUTROPHILS NFR BLD: 63.1 % (ref 44–72)
NRBC # BLD AUTO: 0 K/UL
NRBC BLD-RTO: 0 /100 WBC (ref 0–0.2)
PLATELET # BLD AUTO: 357 K/UL (ref 164–446)
PMV BLD AUTO: 10.3 FL (ref 9–12.9)
POTASSIUM SERPL-SCNC: 3.6 MMOL/L (ref 3.6–5.5)
PROCALCITONIN SERPL-MCNC: <0.05 NG/ML
PROT SERPL-MCNC: 7.2 G/DL (ref 6–8.2)
RBC # BLD AUTO: 4.52 M/UL (ref 4.2–5.4)
SODIUM SERPL-SCNC: 139 MMOL/L (ref 135–145)
TRIGL SERPL-MCNC: 141 MG/DL (ref 0–149)
TROPONIN T SERPL-MCNC: 10 NG/L (ref 6–19)
TROPONIN T SERPL-MCNC: <6 NG/L (ref 6–19)
WBC # BLD AUTO: 12.3 K/UL (ref 4.8–10.8)

## 2023-09-21 PROCEDURE — 80061 LIPID PANEL: CPT

## 2023-09-21 PROCEDURE — 80053 COMPREHEN METABOLIC PANEL: CPT

## 2023-09-21 PROCEDURE — 99233 SBSQ HOSP IP/OBS HIGH 50: CPT | Performed by: HOSPITALIST

## 2023-09-21 PROCEDURE — 84145 PROCALCITONIN (PCT): CPT

## 2023-09-21 PROCEDURE — 92610 EVALUATE SWALLOWING FUNCTION: CPT

## 2023-09-21 PROCEDURE — 85025 COMPLETE CBC W/AUTO DIFF WBC: CPT

## 2023-09-21 PROCEDURE — 93306 TTE W/DOPPLER COMPLETE: CPT | Mod: 26,59 | Performed by: INTERNAL MEDICINE

## 2023-09-21 PROCEDURE — 84484 ASSAY OF TROPONIN QUANT: CPT

## 2023-09-21 PROCEDURE — 78452 HT MUSCLE IMAGE SPECT MULT: CPT

## 2023-09-21 PROCEDURE — 96372 THER/PROPH/DIAG INJ SC/IM: CPT

## 2023-09-21 PROCEDURE — 93010 ELECTROCARDIOGRAM REPORT: CPT | Mod: 76 | Performed by: INTERNAL MEDICINE

## 2023-09-21 PROCEDURE — 700102 HCHG RX REV CODE 250 W/ 637 OVERRIDE(OP): Performed by: INTERNAL MEDICINE

## 2023-09-21 PROCEDURE — 93010 ELECTROCARDIOGRAM REPORT: CPT | Performed by: INTERNAL MEDICINE

## 2023-09-21 PROCEDURE — 700111 HCHG RX REV CODE 636 W/ 250 OVERRIDE (IP): Mod: JZ | Performed by: INTERNAL MEDICINE

## 2023-09-21 PROCEDURE — 96375 TX/PRO/DX INJ NEW DRUG ADDON: CPT

## 2023-09-21 PROCEDURE — A9270 NON-COVERED ITEM OR SERVICE: HCPCS | Performed by: INTERNAL MEDICINE

## 2023-09-21 PROCEDURE — 93005 ELECTROCARDIOGRAM TRACING: CPT | Performed by: STUDENT IN AN ORGANIZED HEALTH CARE EDUCATION/TRAINING PROGRAM

## 2023-09-21 PROCEDURE — 700117 HCHG RX CONTRAST REV CODE 255: Performed by: INTERNAL MEDICINE

## 2023-09-21 PROCEDURE — 83036 HEMOGLOBIN GLYCOSYLATED A1C: CPT

## 2023-09-21 PROCEDURE — A9270 NON-COVERED ITEM OR SERVICE: HCPCS | Performed by: HOSPITALIST

## 2023-09-21 PROCEDURE — G0378 HOSPITAL OBSERVATION PER HR: HCPCS

## 2023-09-21 PROCEDURE — 700102 HCHG RX REV CODE 250 W/ 637 OVERRIDE(OP): Performed by: HOSPITALIST

## 2023-09-21 PROCEDURE — 93306 TTE W/DOPPLER COMPLETE: CPT

## 2023-09-21 PROCEDURE — 700111 HCHG RX REV CODE 636 W/ 250 OVERRIDE (IP)

## 2023-09-21 RX ORDER — OMEPRAZOLE 20 MG/1
20 CAPSULE, DELAYED RELEASE ORAL DAILY
Status: DISCONTINUED | OUTPATIENT
Start: 2023-09-21 | End: 2023-09-22

## 2023-09-21 RX ORDER — REGADENOSON 0.08 MG/ML
INJECTION, SOLUTION INTRAVENOUS
Status: COMPLETED
Start: 2023-09-21 | End: 2023-09-21

## 2023-09-21 RX ORDER — AMINOPHYLLINE 25 MG/ML
INJECTION, SOLUTION INTRAVENOUS
Status: COMPLETED
Start: 2023-09-21 | End: 2023-09-21

## 2023-09-21 RX ORDER — ASPIRIN 300 MG/1
300 SUPPOSITORY RECTAL DAILY
Status: DISCONTINUED | OUTPATIENT
Start: 2023-09-22 | End: 2023-09-22 | Stop reason: HOSPADM

## 2023-09-21 RX ORDER — ASPIRIN 81 MG/1
81 TABLET, CHEWABLE ORAL DAILY
Status: DISCONTINUED | OUTPATIENT
Start: 2023-09-22 | End: 2023-09-22 | Stop reason: HOSPADM

## 2023-09-21 RX ADMIN — NITROGLYCERIN 0.4 MG: 0.4 TABLET, ORALLY DISINTEGRATING SUBLINGUAL at 03:57

## 2023-09-21 RX ADMIN — SENNOSIDES AND DOCUSATE SODIUM 2 TABLET: 50; 8.6 TABLET ORAL at 06:12

## 2023-09-21 RX ADMIN — ENOXAPARIN SODIUM 40 MG: 100 INJECTION SUBCUTANEOUS at 17:12

## 2023-09-21 RX ADMIN — SENNOSIDES AND DOCUSATE SODIUM 2 TABLET: 50; 8.6 TABLET ORAL at 17:12

## 2023-09-21 RX ADMIN — REGADENOSON 0.4 MG: 0.08 INJECTION, SOLUTION INTRAVENOUS at 15:02

## 2023-09-21 RX ADMIN — ACETAMINOPHEN 650 MG: 325 TABLET, FILM COATED ORAL at 04:33

## 2023-09-21 RX ADMIN — ASPIRIN 325 MG: 325 TABLET ORAL at 06:13

## 2023-09-21 RX ADMIN — AMINOPHYLLINE 100 MG: 25 INJECTION, SOLUTION INTRAVENOUS at 15:06

## 2023-09-21 RX ADMIN — HUMAN ALBUMIN MICROSPHERES AND PERFLUTREN 3 ML: 10; .22 INJECTION, SOLUTION INTRAVENOUS at 11:30

## 2023-09-21 RX ADMIN — OMEPRAZOLE 20 MG: 20 CAPSULE, DELAYED RELEASE ORAL at 18:22

## 2023-09-21 ASSESSMENT — ENCOUNTER SYMPTOMS
NAUSEA: 0
VOMITING: 0
CHILLS: 0
PALPITATIONS: 0
SPUTUM PRODUCTION: 0
HEARTBURN: 0
PND: 0
MYALGIAS: 0
ORTHOPNEA: 0
WEIGHT LOSS: 0
CLAUDICATION: 0
DOUBLE VISION: 0
SPEECH CHANGE: 0
FOCAL WEAKNESS: 0
SHORTNESS OF BREATH: 1
WHEEZING: 0
BRUISES/BLEEDS EASILY: 0
NECK PAIN: 0
HEMOPTYSIS: 0
DIZZINESS: 0
BLURRED VISION: 0
TREMORS: 0
COUGH: 0
BACK PAIN: 0
POLYDIPSIA: 0
FEVER: 0
HALLUCINATIONS: 0
DEPRESSION: 0
FLANK PAIN: 0
NERVOUS/ANXIOUS: 0
PHOTOPHOBIA: 0
HEADACHES: 0

## 2023-09-21 ASSESSMENT — FIBROSIS 4 INDEX: FIB4 SCORE: 0.5

## 2023-09-21 ASSESSMENT — PAIN DESCRIPTION - PAIN TYPE: TYPE: ACUTE PAIN

## 2023-09-21 ASSESSMENT — LIFESTYLE VARIABLES: SUBSTANCE_ABUSE: 0

## 2023-09-21 NOTE — THERAPY
Speech Language Therapy Contact Note    Patient Name: Fallon Richardson  Age:  43 y.o., Sex:  female  Medical Record #: [id number]  Today's Date: 9/21/2023    Discussed missed therapy with RN.        09/21/23 1349   Treatment Variance   Reason For Missed Therapy Medical - Other (Please Comment)   Initial Contact Note    Initial Contact Note  Order Received and Verified, Speech Therapy Evaluation in Progress with Full Report to Follow.   Interdisciplinary Plan of Care Collaboration   IDT Collaboration with  Nursing   Collaboration Comments MBSS planned for today at 1400. RN informed SLP that patient is now NPO for stress test. Will hold and attempt tomorrow pending radiology/SLP schedule.

## 2023-09-21 NOTE — ASSESSMENT & PLAN NOTE
Hemoglobin 11.3, MCV 74.  Probably blood loss anemia of iron deficiency  Follow-up with PCP    We will check iron levels

## 2023-09-21 NOTE — H&P
Hospital Medicine History & Physical Note    Date of Service  9/20/2023    Primary Care Physician  Pcp Pt States None        Code Status  Full Code    Chief Complaint  Chest pain, shortness of breath    History of Presenting Illness  Fallon Richardson is a 43 y.o. female without significant past medical history who presented 9/20/2023 with multiple complaints.  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  Chest pain lasted more than 1 hour, therefore she decided to seek assistance, since usually the chest pain lasted less than 1 hour.  Chest pain would radiate to the left shoulder and associated with subjective shortness of breath.  She additionally reported dyspnea on exertion, occasional dizziness in the position upright, and finally occasional choking with liquids and globus sensation in the throat.  Serial troponins at outside hospital are not elevated, BNP not elevated and chest x-ray showed some dynamic ST depression.  Therefore she was transferred here for further evaluation.  At this time she denies any pain.  Patient denies any risk factors, including denies smoking, or family history of early CAD.    CBC: WBC 16.7, hemoglobin 11.3, MCV 74, platelets 359.  Imaging granulocytes 0.3, INR 1.0, D-dimer 0.37.  Chemistry: Glucose 163 otherwise unremarkable.  Troponin x3 is not elevated, proBNP is not elevated.  EKG: Sinus tachycardia heart rate 106 left ventricular hypertrophy, minimal ST depression T wave flattening in anteroseptal leads, incomplete RBBB.  Chest x-ray: No acute process.  I discussed the plan of care with patient.    Review of Systems  Review of Systems   Constitutional:  Negative for chills, fever and weight loss.   HENT:  Negative for ear pain, hearing loss and tinnitus.    Eyes:  Negative  for blurred vision, double vision and photophobia.   Respiratory:  Positive for shortness of breath. Negative for cough, hemoptysis and sputum production.    Cardiovascular:  Positive for chest pain. Negative for palpitations and orthopnea.   Gastrointestinal:  Negative for heartburn, nausea and vomiting.   Genitourinary:  Negative for dysuria, flank pain, frequency and hematuria.   Musculoskeletal:  Negative for back pain, joint pain and neck pain.   Skin:  Negative for itching and rash.   Neurological:  Negative for tremors, speech change, focal weakness and headaches.   Endo/Heme/Allergies:  Negative for environmental allergies and polydipsia. Does not bruise/bleed easily.   Psychiatric/Behavioral:  Negative for hallucinations and substance abuse. The patient is not nervous/anxious.        Past Medical History   has a past medical history of Headache(784.0).    Surgical History   has no past surgical history on file.     Family History  family history includes Cancer in her maternal grandfather and maternal grandmother; Diabetes in her mother; Hyperlipidemia in her mother.   Family history reviewed with patient. There is no family history that is pertinent to the chief complaint.     Social History   reports that she has never smoked. She has never used smokeless tobacco. She reports that she does not drink alcohol and does not use drugs.    Allergies  No Known Allergies    Medications  None       Physical Exam  Temp:  [37.2 °C (99 °F)] 37.2 °C (99 °F)  Pulse:  [100] 100  Resp:  [18] 18  BP: (121)/(73) 121/73  SpO2:  [94 %] 94 %  Blood Pressure: 121/73   Temperature: 37.2 °C (99 °F)   Pulse: 100   Respiration: 18   Pulse Oximetry: 94 %       Physical Exam  Vitals and nursing note reviewed.   Constitutional:       General: She is not in acute distress.     Appearance: Normal appearance.   HENT:      Head: Normocephalic and atraumatic.      Nose: Nose normal.      Mouth/Throat:      Mouth: Mucous membranes are  "moist.   Eyes:      Extraocular Movements: Extraocular movements intact.      Pupils: Pupils are equal, round, and reactive to light.   Cardiovascular:      Rate and Rhythm: Normal rate and regular rhythm.   Pulmonary:      Effort: Pulmonary effort is normal.      Breath sounds: Normal breath sounds.   Abdominal:      General: Abdomen is flat. There is no distension.      Tenderness: There is no abdominal tenderness.   Musculoskeletal:         General: No swelling or deformity. Normal range of motion.      Cervical back: Normal range of motion and neck supple.   Skin:     General: Skin is warm and dry.   Neurological:      General: No focal deficit present.      Mental Status: She is alert and oriented to person, place, and time.   Psychiatric:         Mood and Affect: Mood is anxious.         Behavior: Behavior normal.         Laboratory:          No results for input(s): \"ALTSGPT\", \"ASTSGOT\", \"ALKPHOSPHAT\", \"TBILIRUBIN\", \"DBILIRUBIN\", \"GAMMAGT\", \"AMYLASE\", \"LIPASE\", \"ALB\", \"PREALBUMIN\", \"GLUCOSE\" in the last 72 hours.      No results for input(s): \"NTPROBNP\" in the last 72 hours.      No results for input(s): \"TROPONINT\" in the last 72 hours.    Imaging:  NM-CARDIAC STRESS TEST    (Results Pending)         Assessment/Plan:  Justification for Admission Status  I anticipate this patient is appropriate for observation status at this time because chest pain rule out    Patient will need a Telemetry bed on MEDICAL service .  The need is secondary to chest pain rule out.    * Chest pain- (present on admission)  Assessment & Plan  Patient may be moderate risk for CAD.  She presented with atypical chest pain and EKG changes ST depression.  Additionally she reports dyspnea on exertion in the last several months  She will be evaluated with transthoracic echo and pharmacological stress test.  Monitor on telemetry until then repeat troponin and EKG      Leukocytosis  Assessment & Plan  WBC 16.7  Denies any cough, fever, " dysuria, diarrhea, vomiting  Chest x-ray is without acute process  We will check procalcitonin and if elevated consider further testing in search for an infection, including UA and blood culture    Anemia  Assessment & Plan  Hemoglobin 11.3, MCV 74.  Probably blood loss anemia of iron deficiency  Follow-up with PCP    Hyperglycemia  Assessment & Plan  Blood sugar 163  We will check A1c.        VTE prophylaxis: enoxaparin ppx

## 2023-09-21 NOTE — ASSESSMENT & PLAN NOTE
WBC 16.7  Denies any cough, fever, dysuria, diarrhea, vomiting  Chest x-ray is without acute process  We will check procalcitonin and if elevated consider further testing in search for an infection, including UA and blood culture    Likely reactive trending down 12.3

## 2023-09-21 NOTE — ASSESSMENT & PLAN NOTE
Patient may be moderate risk for CAD.  She presented with atypical chest pain and EKG changes ST depression.  Additionally she reports dyspnea on exertion in the last several months  She will be evaluated with transthoracic echo and pharmacological stress test.  Monitor on telemetry until then repeat troponin and EKG    Stress test pending  Echocardiogram showed normal ejection fraction  D-dimer negative outside facility

## 2023-09-21 NOTE — THERAPY
"Speech Language Pathology   Clinical Swallow Evaluation     Patient Name: Fallon Richardson  AGE:  43 y.o., SEX:  female  Medical Record #: [id number]  Date of Service: 9/21/2023      History of Present Illness  Pt is a 43 y.o. F who presented to an outside facility d/t chest pain, dyspnea on exertion, ocassional dizziness, and occasional choking on liquids and globus sensation in the throat. Serial troponins at outside hospital are not elevated, BNP not elevated and chest x-ray showed some dynamic ST depression.  Therefore she was transferred here for further evaluation.    CMHx: Chest pain, hyperglycemia, anemia, leukocytosis   PMHx: None indicated in EMR     No hx of ST at [facility] indicated in EMR.     Per EMR, \"Chest x-ray: No acute process.\"     General Information:  Vitals  O2 Delivery Device: None - Room Air  Level of Consciousness: Alert, Awake  Patient Behaviors:  (pleasant;cooperative)  Orientation: Oriented x 4  Follows Directives: Yes    Prior Living Situation & Level of Function:  Prior Services: None, Home-Independent  Lives with - Patient's Self Care Capacity: Unrelated Adult  Communication: WFL  Swallowing: Pt endorsed 4-5 month hx of odynophagia/globus on PO. Pt stated increased recent increase in symptoms associated with chest pain.     Oral Mechanism Evaluation:  Dentition: Fair, Natural dentition   Facial Symmetry: Equal  Facial Sensation: Equal     Labial Observations: WFL   Lingual Observations: Midline  Motor Speech: WFL       Laryngeal Function:  Secretion Management: Adequate  Voice Quality: WFL  Cough: Perceptually WNL    Subjective  Pt cleared by RN for clinical bedside swallow evaluation. Pt was recieved awake,alert, and was cooperative/pleasant with SLP tasks. SLP offered  services. Pt declined requesting dtr to translate. Pt denied recent pnuemonia and acid reflux/GERD. Pt endorsed globus and odynophagia, specifically on liquids, x4-5 months. Pt endorsed symptoms have been " "exacterbated by chest pain. Pt stating, \"Sometimes it feels like it won't go down, and I have to throw it back up.\" Pt stated she has begun to implement stratedgies for PO intake prior to admit such as slow rate and small bites/sips to improve globus/odynophagia.     Assessment  Current Method of Nutrition: Oral diet (Regular solids (R7), Thin liquids (TN0) -Cardiac)  Positioning: Cook's (60-90 degrees)  Bolus Administration: Patient  O2 Delivery Device: None - Room Air  Factor(s) Affecting Performance: None     Swallowing Trials:  Swallowing Trials  Thin Liquid (TN0): Impaired  Liquidised (LQ3): WFL  Minced & Moist (MM5): WFL  Regular (RG7): WFL    Comments:   Pt was sitting upright midline in Cook’s prior to PO trials. Patient managed trials of thin liquids (cup), liquidized, soft and bite size, and regular were completed. Pt demonstrated appropriate bolus containment, stripping, and A/P transport of the bolus inferred to be WNL evidenced via lack of oral residue upon visual inspection. Mastication was coordinated and timely. HLE was palpated and present; ~2-3 swallows appreciated per bolus of thin liquids. Single swallow per bolus appreciated on all other PO. No overt s/sx of laryngeal insufficiency appreciated including cough, wet vocal quality, or increased WOB. Pt endorsing globus and odynophagia on thin liquids pointing to distal esophagus. Pt observed to consistently to eructate throughout PO trials.  On sequential sips of thin liquids, pt with observed facial grimace stating, \"It feels like it got stuck and once I burped it went down.\" Pt independently feeding with slow rate and small volume. Pt denied globus/odynophagia when modulating rate/volume. No emesis or regurgitation appreciated on PO.     Pt/family education provided on the anatomy/physiology of deglutition and s/sx of aspiration. Additional education provided on good, thorough, oral care and mobility, as medically appropriate, to mitigate the " risk associated with aspiration. SLP reviewed reflux precautions and compensatory strategies with the pt. Utilizing the teach back method, pt verbally demonstrated understanding with 100% accuracy.      Clinical Impressions  Pt presenting with s/sx of dysphagia including odynophagia and globus on large volumes of PO, specifically thin liquids, concerning for esophageal dysfunction likely acute on chronic given Hx of symptoms. Pt with good mobility, strong cough, independent implementation of strategies, and fair oral care militating the risk associated with aspiration. Therefore, SLP recommending continuation of R7/TN0 diet adherence to the recommendations listed below. Pt would benefit from a instrumental swallow study via MBSS to assess swallow efficiency and airway protection, and determine the efficacy of swallowing exercises and/or compensatory strategies to improve dysphagia outcomes. RN and MD aware of recommendations. Risk or aspiration or related sequelae can be mitigated with the use of frequent, thorough, oral care.      Recommendations  Diet Consistency: Regular solids (R7), Thin liquids (TN0)  Instrumentation: VFSS (MBSS)  Medication: Whole with puree, Crush with applesauce, as appropriate, As tolerated  Supervision: Distant supervision - check on patient 2-3 times per meal  Positioning: Fully upright and midline during oral intake, Remain upright for 45 minutes after oral intake, Meals sitting upright in a chair, as tolerated  Risk Management : Small bites/sips, Alternate bites and sips, Slow rate of intake, Physical mobility, as tolerated  Oral Care: BID  Consult Referral(s): Gastroenterologist    SLP Treatment Plan  Treatment Plan: Dysphagia Treatment, Patient/Family/Caregiver Training  SLP Frequency: 3x Per Week  Estimated Duration: Until Therapy Goals Met    Anticipated Discharge Needs  Discharge Recommendations: Other (TBD pend clinical progress)   Therapy Recommendations Upon DC: Dysphagia  "Training, Patient / Family / Caregiver Education      Patient / Family Goals  Patient / Family Goal #1: \"I haven't had much to eat\"  Short Term Goals  Short Term Goal # 1: Pt percy consume a regular solid and thin liquid diet without any overt s/sx of aspiration or decline in respiratory status.  Short Term Goal # 2: Pt will independently implement reflux precautions during mealtimes with >90% accuracy.  Short Term Goal # 3: Pt will participate in an instrumental swallow study to determine airway protection/swallow efficiency and guide dysphagia management.    Latisha Rivera, SLP   "

## 2023-09-21 NOTE — CARE PLAN
Problem: Knowledge Deficit - Standard  Goal: Patient and family/care givers will demonstrate understanding of plan of care, disease process/condition, diagnostic tests and medications  Outcome: Progressing     Problem: Communication  Goal: The ability to communicate needs accurately and effectively will improve  Outcome: Progressing     Problem: Discharge Barriers/Planning  Goal: Patient's continuum of care needs are met  Outcome: Progressing     Problem: Hemodynamics  Goal: Patient's hemodynamics, fluid balance and neurologic status will be stable or improve  Outcome: Progressing     Problem: Respiratory  Goal: Patient will achieve/maintain optimum respiratory ventilation and gas exchange  Outcome: Progressing     Problem: Fluid Volume  Goal: Fluid volume balance will be maintained  Outcome: Progressing     Problem: Nutrition  Goal: Patient's nutritional and fluid intake will be adequate or improve  Outcome: Progressing     Problem: Urinary Elimination  Goal: Establish and maintain regular urinary output  Outcome: Progressing     Problem: Bowel Elimination  Goal: Establish and maintain regular bowel function  Outcome: Progressing     Problem: Gastrointestinal Irritability  Goal: Nausea and vomiting will be absent or improve  Outcome: Progressing  Goal: Diarrhea will be absent or improved  Outcome: Progressing     Problem: Mobility  Goal: Patient's capacity to carry out activities will improve  Outcome: Progressing     Problem: Self Care  Goal: Patient will have the ability to perform ADLs independently or with assistance (bathe, groom, dress, toilet and feed)  Outcome: Progressing     Problem: Infection - Standard  Goal: Patient will remain free from infection  Outcome: Progressing       The patient is Stable - Low risk of patient condition declining or worsening    Progress made toward(s) clinical / shift goals:  No chest pain. VSS. SR-ST on the monitor.  Came in with nausea, tolerated sprite zero and saltines.  No episodes of emesis.    Patient is not progressing towards the following goals:

## 2023-09-21 NOTE — PROGRESS NOTES
Assumed care at 7am, bedside report received from Sheila CABALLERO. Pt. Is SR on the monitor. Initial assessment completed, orders reviewed, call light within reach, and hourly rounding in place. POC addressed with patient, no additional questions at this time.

## 2023-09-21 NOTE — PROGRESS NOTES
4 Eyes Skin Assessment Completed by ADA Sosa and ELIANE Lee.    Head WDL  Ears WDL  Nose WDL  Mouth WDL  Neck WDL  Breast/Chest WDL  Shoulder Blades WDL  Spine WDL  (R) Arm/Elbow/Hand Redness and Blanching  (L) Arm/Elbow/Hand WDL  Abdomen Redness and Blanching  Groin WDL  Scrotum/Coccyx/Buttocks Redness and Blanching  (R) Leg Scar, healed and intact  (L) Leg WDL  (R) Heel/Foot/Toe Scab  (L) Heel/Foot/Toe WDL          Devices In Places Tele Box, Blood Pressure Cuff, and Pulse Ox      Interventions In Place Pillows    Possible Skin Injury No    Pictures Uploaded Into Epic N/A  Wound Consult Placed N/A  RN Wound Prevention Protocol Ordered No

## 2023-09-22 ENCOUNTER — APPOINTMENT (OUTPATIENT)
Dept: RADIOLOGY | Facility: MEDICAL CENTER | Age: 43
End: 2023-09-22
Attending: HOSPITALIST
Payer: COMMERCIAL

## 2023-09-22 ENCOUNTER — APPOINTMENT (OUTPATIENT)
Dept: RADIOLOGY | Facility: MEDICAL CENTER | Age: 43
End: 2023-09-22
Attending: HOSPITALIST

## 2023-09-22 VITALS
OXYGEN SATURATION: 95 % | TEMPERATURE: 97.2 F | SYSTOLIC BLOOD PRESSURE: 106 MMHG | HEART RATE: 72 BPM | HEIGHT: 60 IN | DIASTOLIC BLOOD PRESSURE: 71 MMHG | RESPIRATION RATE: 20 BRPM | WEIGHT: 149.91 LBS | BODY MASS INDEX: 29.43 KG/M2

## 2023-09-22 PROBLEM — D72.829 LEUKOCYTOSIS: Status: RESOLVED | Noted: 2023-09-21 | Resolved: 2023-09-22

## 2023-09-22 PROBLEM — R07.9 CHEST PAIN: Status: RESOLVED | Noted: 2023-09-20 | Resolved: 2023-09-22

## 2023-09-22 PROBLEM — R73.9 HYPERGLYCEMIA: Status: RESOLVED | Noted: 2023-09-21 | Resolved: 2023-09-22

## 2023-09-22 LAB
FERRITIN SERPL-MCNC: 11.9 NG/ML (ref 10–291)
HGB RETIC QN AUTO: 27.5 PG/CELL (ref 29–35)
IMM RETICS NFR: 27 % (ref 2.6–16.1)
IRON SATN MFR SERPL: 8 % (ref 15–55)
IRON SERPL-MCNC: 24 UG/DL (ref 40–170)
RETICS # AUTO: 0.08 M/UL (ref 0.04–0.12)
RETICS/RBC NFR: 1.8 % (ref 0.8–2.6)
TIBC SERPL-MCNC: 290 UG/DL (ref 250–450)
UIBC SERPL-MCNC: 266 UG/DL (ref 110–370)
VIT B12 SERPL-MCNC: 675 PG/ML (ref 211–911)

## 2023-09-22 PROCEDURE — 99239 HOSP IP/OBS DSCHRG MGMT >30: CPT | Performed by: HOSPITALIST

## 2023-09-22 PROCEDURE — 83550 IRON BINDING TEST: CPT

## 2023-09-22 PROCEDURE — 83540 ASSAY OF IRON: CPT

## 2023-09-22 PROCEDURE — 700111 HCHG RX REV CODE 636 W/ 250 OVERRIDE (IP): Mod: JZ | Performed by: HOSPITALIST

## 2023-09-22 PROCEDURE — 74230 X-RAY XM SWLNG FUNCJ C+: CPT

## 2023-09-22 PROCEDURE — 92611 MOTION FLUOROSCOPY/SWALLOW: CPT

## 2023-09-22 PROCEDURE — 96365 THER/PROPH/DIAG IV INF INIT: CPT

## 2023-09-22 PROCEDURE — 700102 HCHG RX REV CODE 250 W/ 637 OVERRIDE(OP): Performed by: HOSPITALIST

## 2023-09-22 PROCEDURE — G0378 HOSPITAL OBSERVATION PER HR: HCPCS

## 2023-09-22 PROCEDURE — 82728 ASSAY OF FERRITIN: CPT

## 2023-09-22 PROCEDURE — A9270 NON-COVERED ITEM OR SERVICE: HCPCS | Performed by: HOSPITALIST

## 2023-09-22 PROCEDURE — 700105 HCHG RX REV CODE 258: Performed by: HOSPITALIST

## 2023-09-22 PROCEDURE — 85046 RETICYTE/HGB CONCENTRATE: CPT

## 2023-09-22 PROCEDURE — 82607 VITAMIN B-12: CPT

## 2023-09-22 RX ORDER — LANOLIN ALCOHOL/MO/W.PET/CERES
325 CREAM (GRAM) TOPICAL DAILY
Qty: 30 TABLET | Refills: 0 | Status: SHIPPED | OUTPATIENT
Start: 2023-09-22 | End: 2023-10-22

## 2023-09-22 RX ORDER — OMEPRAZOLE 20 MG/1
20 CAPSULE, DELAYED RELEASE ORAL DAILY
Qty: 30 CAPSULE | Refills: 0 | Status: SHIPPED | OUTPATIENT
Start: 2023-09-23

## 2023-09-22 RX ORDER — OMEPRAZOLE 20 MG/1
20 CAPSULE, DELAYED RELEASE ORAL DAILY
Status: DISCONTINUED | OUTPATIENT
Start: 2023-09-22 | End: 2023-09-22 | Stop reason: HOSPADM

## 2023-09-22 RX ADMIN — OMEPRAZOLE 20 MG: 20 CAPSULE, DELAYED RELEASE ORAL at 05:40

## 2023-09-22 RX ADMIN — ASPIRIN 81 MG 81 MG: 81 TABLET ORAL at 05:39

## 2023-09-22 RX ADMIN — SODIUM CHLORIDE 250 MG: 9 INJECTION, SOLUTION INTRAVENOUS at 08:59

## 2023-09-22 ASSESSMENT — FIBROSIS 4 INDEX: FIB4 SCORE: 0.5

## 2023-09-22 ASSESSMENT — PAIN DESCRIPTION - PAIN TYPE: TYPE: ACUTE PAIN

## 2023-09-22 NOTE — PROGRESS NOTES
Monitor Summary:    Rhythm: SR    HR: 80-95    Measurements:.14/.07/.35    Ectopy: none              Normal Values  Rhythm SR  HR Range    Measurements 0.12-0.20 / 0.06-0.10  / 0.30-0.52

## 2023-09-22 NOTE — PROGRESS NOTES
DC instructions reviewed w/ pt , verbalized understanding. PIV dc'd, armband removed.  Pt declined  service and understands/speaks English.

## 2023-09-22 NOTE — CARE PLAN
Problem: Knowledge Deficit - Standard  Goal: Patient and family/care givers will demonstrate understanding of plan of care, disease process/condition, diagnostic tests and medications  Outcome: Progressing     Problem: Communication  Goal: The ability to communicate needs accurately and effectively will improve  Outcome: Progressing     Problem: Discharge Barriers/Planning  Goal: Patient's continuum of care needs are met  Outcome: Progressing     Problem: Hemodynamics  Goal: Patient's hemodynamics, fluid balance and neurologic status will be stable or improve  Outcome: Progressing     Problem: Respiratory  Goal: Patient will achieve/maintain optimum respiratory ventilation and gas exchange  Outcome: Progressing     Problem: Fluid Volume  Goal: Fluid volume balance will be maintained  Outcome: Progressing     Problem: Nutrition  Goal: Patient's nutritional and fluid intake will be adequate or improve  Outcome: Progressing     Problem: Urinary Elimination  Goal: Establish and maintain regular urinary output  Outcome: Progressing     Problem: Bowel Elimination  Goal: Establish and maintain regular bowel function  Outcome: Progressing     Problem: Gastrointestinal Irritability  Goal: Nausea and vomiting will be absent or improve  Outcome: Progressing     Problem: Mobility  Goal: Patient's capacity to carry out activities will improve  Outcome: Progressing     Problem: Self Care  Goal: Patient will have the ability to perform ADLs independently or with assistance (bathe, groom, dress, toilet and feed)  Outcome: Progressing     Problem: Infection - Standard  Goal: Patient will remain free from infection  Outcome: Progressing     Problem: Pain - Standard  Goal: Alleviation of pain or a reduction in pain to the patient’s comfort goal  Outcome: Progressing       The patient is Stable - Low risk of patient condition declining or worsening    Shift Goals  Clinical Goals: modified barium swallow eval  Patient Goals: no chest  pain, rest    Progress made toward(s) clinical / shift goals:  Pt tolerating diet although at times has some trouble with swallowing. Plan for modified barium swallow eval today.  No c/o chest pain.    Patient is not progressing towards the following goals:

## 2023-09-22 NOTE — CARE PLAN
The patient is Stable - Low risk of patient condition declining or worsening    Shift Goals  Clinical Goals: Stress test, echo  Patient Goals: Rest,    Progress made toward(s) clinical / shift goals:  Problem: Knowledge Deficit - Standard  Goal: Patient and family/care givers will demonstrate understanding of plan of care, disease process/condition, diagnostic tests and medications  Outcome: Progressing     Problem: Pain - Standard  Goal: Alleviation of pain or a reduction in pain to the patient’s comfort goal  Outcome: Progressing

## 2023-09-22 NOTE — THERAPY
Speech Language Pathology   Videofluoroscopic Swallow Study Evaluation     Patient Name: Fallon Richardson  AGE:  43 y.o., SEX:  female  Medical Record #: [id number]  Date of Service: 9/22/2023      History of Present Illness  42 y/o F admit 9/20 w/ chest pain. PMH unremarkable. ST consulted d/t pt reporting globus sensation of ~ 1 year onset with liquids (not solids) localized to the pharyngeal area.     Pertinent Information  Current Method of Nutrition: Oral diet (Regular solids, Thin/all Liquids)     Dentition: Natural dentition  Secretion Management: Adequate     Tracheostomy: No       Factor(s) Affecting Performance: None    Discussed the risks, benefits, and alternatives of the VFSS procedure. Patient/family acknowledged and agreed to proceed.    Assessment  Videofluoroscopic Swallow Study was conducted in the Lateral, A-P projection(s) to evaluate oropharyngeal swallow function. A radiology tech was present to assist with the procedure.    Positioning: Standing (AP view), Seated upright in fluoroscopy chair  Anatomic View: WFL  Bolus Administration: Patient  PO Barium Contrast Trials: Varibar thin, Varibar pudding, Soft & Bite sized coated with Varibar powder, Regular solid coated with Varibar pudding      Consistency PAS Score Timing Residue Comments   Thin Liquid 1 N/A Vallecular Residue: None (0%)  Pyriform Sinus Residue: None (0%) Single sips & sequential straw sips completed in both lateral and AP view. Thin liquids used for an AP esophageal sweep as well, which was unremarkable   Pudding 1 N/A Vallecular Residue: None (0%)       Soft & Bite Sized 1 N/A Vallecular Residue: None (0%)  Pyriform Sinus Residue: None (0%)    Regular Solid 1 N/A Vallecular Residue: None (0%)  Pyriform Sinus Residue: None (0%)      Penetration-Aspiration Scale (PAS)  1     No contrast enters airway  2     Contrast enters the airway, remains above the vocal folds, and is ejected from the airway (not seen in the airway at  the end of the swallow).  3     Contrast enters the airway, remains above the vocal folds, and is not ejected from the airway (is seen in the airway after the swallow).  4     Contrast enters the airway, contacts the vocal folds, and is ejected from the airway.  5     Contrast enters the airway, contacts the vocal folds, and is not ejected from the airway  6     Contrast enters the airway, crosses the plane of the vocal folds, and is ejected from the airway.  7     Contrast enters the airway, crosses the plane of the vocal folds, and is not ejected from the airway despite effort.  8     Contrast enters the airway, crosses the plane of the vocal folds, is not ejected from the airway and there is no response to aspiration.     Oral phase: Functional mastication across all consistencies, no oral residue    Pharyngeal phase: Timely swallow initiation. Good swallow efficiency with no gross pharyngeal residue visualized across all consistencies. Pt did not endorse globus with liquids during the exam, only transient + mild globus with the liquid from the soft bites textures (chopped fruit in liquid). No residual correlated to the location of the pt's globus sensation during that incident.     Compensatory Strategies: n/a    Severity Rating:   Severity Rating: JEFFREY     JEFFREY: Normal    Clinical Impressions  The pt presents with oropharyngeal swallow function within normal limits. She continues to c/o globus sensation localized to the cervical area w/ liquids > solids. Symptoms may be consistent with a motility disorder. MBS today was negative for any abnormality. Recommend a GI consult, with consideration of motility studies- consulted w/ attending MD on same.      ST provided pt w/ education re: reflux precautions; pt demo'd good v/u. ST to sign off at this time.     Recommendations  Diet Consistency: Regular solids (R7), Thin liquids (TN0)  Medication: As tolerated  Supervision: Independent  Positioning: Meals sitting  "upright in a chair, as tolerated, Remain upright for 30 minutes after oral intake     Oral Care: BID     Consult Referral(s): Gastroenterologist    SLP Treatment Plan  Treatment Plan: None Indicated  SLP Frequency: N/A - Evaluation Only  Estimated Duration: N/A - Evaluation Only    Anticipated Discharge Needs  Discharge Recommendations: Anticipate that the patient will have no further speech therapy needs after discharge from the hospital   Therapy Recommendations Upon DC: Not Indicated      Patient / Family Goals  Patient / Family Goal #1: \"I haven't had much to eat\"  Goal #1 Outcome: Goal met  Short Term Goal # 1: Pt percy consume a regular solid and thin liquid diet without any overt s/sx of aspiration or decline in respiratory status.  Goal Outcome # 1: Goal met  Short Term Goal # 2: Pt will independently implement reflux precautions during mealtimes with >90% accuracy.  Goal Outcome # 2 : Goal met  Short Term Goal # 3: Pt will participate in an instrumental swallow study to determine airway protection/swallow efficiency and guide dysphagia management.  Goal Outcome  # 3: Goal met    LORETTA Randhawa   "

## 2023-09-22 NOTE — PROGRESS NOTES
Hospital Medicine Daily Progress Note    Date of Service  9/21/2023    Chief Complaint  Fallon Richardson is a 43 y.o. female admitted 9/20/2023 with chest pain    Hospital Course  Fallon Richardson is a 43 y.o. female without significant past medical history who presented 9/20/2023 with multiple complaints.  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  Chest pain lasted more than 1 hour, therefore she decided to seek assistance, since usually the chest pain lasted less than 1 hour.  Chest pain would radiate to the left shoulder and associated with subjective shortness of breath.  She additionally reported dyspnea on exertion, occasional dizziness in the position upright, and finally occasional choking with liquids and globus sensation in the throat.  Serial troponins at outside hospital are not elevated, BNP not elevated and chest x-ray showed some dynamic ST depression.  Therefore she was transferred here for further evaluation.  At this time she denies any pain.  Patient denies any risk factors, including denies smoking, or family history of early CAD.     CBC: WBC 16.7, hemoglobin 11.3, MCV 74, platelets 359.  Imaging granulocytes 0.3, INR 1.0, D-dimer 0.37.  Chemistry: Glucose 163 otherwise unremarkable.  Troponin x3 is not elevated, proBNP is not elevated.  EKG: Sinus tachycardia heart rate 106 left ventricular hypertrophy, minimal ST depression T wave flattening in anteroseptal leads, incomplete RBBB.    Interval Problem Update  9/21 patient is new to me today, resting in bed, no fever no chills no nausea no vomiting, chest pain is resolved, denies any palpitation, no focal weakness no numbness no tingling, complaining of sometimes difficulty swallowing, speech therapy evaluated patient and  recommended regular diet with thin liquids but they are recommended modified barium swallow eval.  Stress test is pending, echocardiogram showed normal ejection fraction no wall motion abnormalities    I have discussed this patient's plan of care and discharge plan at IDT rounds today with Case Management, Nursing, Nursing leadership, and other members of the IDT team.    Consultants/Specialty      Code Status  Full Code    Disposition  The patient is not medically cleared for discharge to home or a post-acute facility.      I have placed the appropriate orders for post-discharge needs.    Review of Systems  Review of Systems   Constitutional:  Negative for chills and fever.   HENT:  Negative for congestion and nosebleeds.    Eyes:  Negative for blurred vision and double vision.   Respiratory:  Negative for cough, hemoptysis and wheezing.    Cardiovascular:  Negative for chest pain, palpitations, claudication, leg swelling and PND.   Gastrointestinal:  Negative for heartburn, nausea and vomiting.   Genitourinary:  Negative for hematuria and urgency.   Musculoskeletal:  Negative for back pain and myalgias.   Skin:  Negative for rash.   Neurological:  Negative for dizziness and headaches.   Endo/Heme/Allergies:  Does not bruise/bleed easily.   Psychiatric/Behavioral:  Negative for depression.         Physical Exam  Temp:  [36.7 °C (98.1 °F)-37.2 °C (99 °F)] 36.9 °C (98.4 °F)  Pulse:  [] 80  Resp:  [17-18] 18  BP: (111-121)/(69-73) 111/72  SpO2:  [93 %-95 %] 95 %    Physical Exam  Vitals and nursing note reviewed.   Constitutional:       General: She is not in acute distress.     Appearance: Normal appearance. She is not ill-appearing.   HENT:      Head: Normocephalic.      Mouth/Throat:      Mouth: Mucous membranes are dry.   Eyes:      General: No scleral icterus.        Right eye: No discharge.         Left eye: No discharge.      Conjunctiva/sclera: Conjunctivae normal.   Cardiovascular:      Rate and Rhythm:  Normal rate and regular rhythm.      Pulses: Normal pulses.      Heart sounds: Normal heart sounds.   Pulmonary:      Effort: Pulmonary effort is normal. No respiratory distress.      Breath sounds: Normal breath sounds.   Abdominal:      General: Bowel sounds are normal. There is no distension.      Tenderness: There is no abdominal tenderness. There is no guarding.   Musculoskeletal:         General: Normal range of motion.      Cervical back: Normal range of motion and neck supple.      Right lower leg: No edema.      Left lower leg: No edema.   Skin:     General: Skin is warm and dry.      Capillary Refill: Capillary refill takes less than 2 seconds.      Coloration: Skin is not jaundiced.   Neurological:      General: No focal deficit present.      Mental Status: She is alert and oriented to person, place, and time.      Cranial Nerves: No cranial nerve deficit.      Motor: No weakness.   Psychiatric:         Mood and Affect: Mood normal.         Behavior: Behavior normal.         Fluids    Intake/Output Summary (Last 24 hours) at 9/21/2023 1752  Last data filed at 9/21/2023 1500  Gross per 24 hour   Intake 720 ml   Output 100 ml   Net 620 ml       Laboratory  Recent Labs     09/21/23  0326   WBC 12.3*   RBC 4.52   HEMOGLOBIN 10.6*   HEMATOCRIT 33.8*   MCV 74.8*   MCH 23.5*   MCHC 31.4*   RDW 42.8   PLATELETCT 357   MPV 10.3     Recent Labs     09/21/23  0326   SODIUM 139   POTASSIUM 3.6   CHLORIDE 104   CO2 24   GLUCOSE 104*   BUN 15   CREATININE 0.48*   CALCIUM 8.2*             Recent Labs     09/21/23  0326   TRIGLYCERIDE 141   HDL 29*   LDL 89       Imaging  NM-CARDIAC STRESS TEST         EC-ECHOCARDIOGRAM COMPLETE W/ CONT   Final Result      DX-ESOPHAGUS - [id number]    (Results Pending)        Assessment/Plan  * Chest pain- (present on admission)  Assessment & Plan  Patient may be moderate risk for CAD.  She presented with atypical chest pain and EKG changes ST depression.  Additionally she reports  dyspnea on exertion in the last several months  She will be evaluated with transthoracic echo and pharmacological stress test.  Monitor on telemetry until then repeat troponin and EKG    Stress test pending  Echocardiogram showed normal ejection fraction  D-dimer negative outside facility      Leukocytosis  Assessment & Plan  WBC 16.7  Denies any cough, fever, dysuria, diarrhea, vomiting  Chest x-ray is without acute process  We will check procalcitonin and if elevated consider further testing in search for an infection, including UA and blood culture    Likely reactive trending down 12.3    Anemia  Assessment & Plan  Hemoglobin 11.3, MCV 74.  Probably blood loss anemia of iron deficiency  Follow-up with PCP    We will check iron levels    Hyperglycemia  Assessment & Plan  Blood sugar 163  A1c         VTE prophylaxis: Lovenox    I have performed a physical exam and reviewed and updated ROS and Plan today (9/21/2023). In review of yesterday's note (9/20/2023), there are no changes except as documented above.      Greater than 51 minutes spent prepping to see patient (e.g. review of tests) obtaining and/or reviewing separately obtained history. Performing a medically appropriate examination and/ evaluation.  Counseling and educating the patient/family/caregiver.  Ordering medications, tests, or procedures.  Referring and communicating with other health care professionals.  Documenting clinical information in EPIC.  Independently interpreting results and communicating results to patient/family/caregiver.  Care coordination.

## 2023-09-22 NOTE — HOSPITAL COURSE
Fallon Richardson is a 43 y.o. female without significant past medical history who presented 9/20/2023 with multiple complaints.  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  Chest pain lasted more than 1 hour, therefore she decided to seek assistance, since usually the chest pain lasted less than 1 hour.  Chest pain would radiate to the left shoulder and associated with subjective shortness of breath.  She additionally reported dyspnea on exertion, occasional dizziness in the position upright, and finally occasional choking with liquids and globus sensation in the throat.  Serial troponins at outside hospital are not elevated, BNP not elevated and chest x-ray showed some dynamic ST depression.  Therefore she was transferred here for further evaluation.  At this time she denies any pain.  Patient denies any risk factors, including denies smoking, or family history of early CAD.     CBC: WBC 16.7, hemoglobin 11.3, MCV 74, platelets 359.  Imaging granulocytes 0.3, INR 1.0, D-dimer 0.37.  Chemistry: Glucose 163 otherwise unremarkable.  Troponin x3 is not elevated, proBNP is not elevated.  EKG: Sinus tachycardia heart rate 106 left ventricular hypertrophy, minimal ST depression T wave flattening in anteroseptal leads, incomplete RBBB.

## 2023-09-23 NOTE — DISCHARGE SUMMARY
Discharge Summary    CHIEF COMPLAINT ON ADMISSION  No chief complaint on file.      Reason for Admission  Chest Pain     Admission Date  9/20/2023    CODE STATUS  Full code    HPI & HOSPITAL COURSE    Fallon Richardson is a 43 y.o. female without significant past medical history who presented 9/20/2023 with multiple complaints.  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  Chest pain lasted more than 1 hour, therefore she decided to seek assistance, since usually the chest pain lasted less than 1 hour.  Chest pain would radiate to the left shoulder and associated with subjective shortness of breath.  She additionally reported dyspnea on exertion, occasional dizziness in the position upright, and finally occasional choking with liquids and globus sensation in the throat.  Serial troponins at outside hospital are not elevated, BNP not elevated and chest x-ray showed some dynamic ST depression.  Therefore she was transferred here for further evaluation.  At this time she denies any pain.  Patient denies any risk factors, including denies smoking, or family history of early CAD.     CBC: WBC 16.7, hemoglobin 11.3, MCV 74, platelets 359.  Imaging granulocytes 0.3, INR 1.0, D-dimer 0.37.  Chemistry: Glucose 163 otherwise unremarkable.  Troponin x3 is not elevated, proBNP is not elevated.  EKG: Sinus tachycardia heart rate 106 left ventricular hypertrophy, minimal ST depression T wave flattening in anteroseptal leads, incomplete RBBB.      Patient had echocardiogram and stress test that were negative, patient was evaluated by speech therapy and patient had swallow eval, patient will need to follow-up with GI, patient probably having GERD, patient started on PPI, patient is feeling much better she is alert oriented  follows commands, she also was found to have anemia likely due to iron deficiency, patient was started on IV iron and she is going to continue on oral iron upon discharge, patient is feeling better she is alert oriented follows commands she is tolerating diet, she is eager to go home, she is being discharged in stable condition, all questions have been answered.    Therefore, she is discharged in good and stable condition to home with close outpatient follow-up.        Discharge Date  9/22/2023    FOLLOW UP ITEMS POST DISCHARGE  Primary care physician  GI    DISCHARGE DIAGNOSES  Principal Problem (Resolved):    Chest pain (POA: Yes)  Active Problems:    Anemia (POA: Unknown)  Resolved Problems:    Hyperglycemia (POA: Unknown)    Leukocytosis (POA: Unknown)      FOLLOW UP  No future appointments.  [address]  [facility] [address]  [phone]  Call  As needed      MEDICATIONS ON DISCHARGE     Medication List        START taking these medications        Instructions   ferrous sulfate 325 (65 Fe) MG EC tablet   Take 1 Tablet by mouth every day for 30 days.  Dose: 325 mg     omeprazole 20 MG delayed-release capsule  Commonly known as: PriLOSEC   Take 1 Capsule by mouth every day.  Dose: 20 mg              Allergies  No Known Allergies    DIET  No orders of the defined types were placed in this encounter.      ACTIVITY  As tolerated.  Weight bearing as tolerated    CONSULTATIONS  None    PROCEDURES  None    LABORATORY  Lab Results   Component Value Date    SODIUM 139 09/21/2023    POTASSIUM 3.6 09/21/2023    CHLORIDE 104 09/21/2023    CO2 24 09/21/2023    GLUCOSE 104 (H) 09/21/2023    BUN 15 09/21/2023    CREATININE 0.48 (L) 09/21/2023        Lab Results   Component Value Date    WBC 12.3 (H) 09/21/2023    HEMOGLOBIN 10.6 (L) 09/21/2023    HEMATOCRIT 33.8 (L) 09/21/2023    PLATELETCT 357 09/21/2023        Total time of the discharge process exceeds 33 minutes.

## 2023-12-21 ENCOUNTER — OFFICE VISIT (OUTPATIENT)
Dept: MEDICAL GROUP | Facility: PHYSICIAN GROUP | Age: 43
End: 2023-12-21
Payer: COMMERCIAL

## 2023-12-21 VITALS
DIASTOLIC BLOOD PRESSURE: 78 MMHG | RESPIRATION RATE: 18 BRPM | HEART RATE: 100 BPM | OXYGEN SATURATION: 100 % | TEMPERATURE: 97.5 F | SYSTOLIC BLOOD PRESSURE: 120 MMHG | WEIGHT: 153 LBS | HEIGHT: 60 IN | BODY MASS INDEX: 30.04 KG/M2

## 2023-12-21 DIAGNOSIS — R73.01 ELEVATED FASTING GLUCOSE: ICD-10-CM

## 2023-12-21 DIAGNOSIS — Z09 HOSPITAL DISCHARGE FOLLOW-UP: ICD-10-CM

## 2023-12-21 DIAGNOSIS — R13.10 DYSPHAGIA, UNSPECIFIED TYPE: ICD-10-CM

## 2023-12-21 DIAGNOSIS — N94.6 DYSMENORRHEA: ICD-10-CM

## 2023-12-21 DIAGNOSIS — D64.9 ANEMIA, UNSPECIFIED TYPE: ICD-10-CM

## 2023-12-21 DIAGNOSIS — H53.9 VISION CHANGES: ICD-10-CM

## 2023-12-21 PROCEDURE — 3078F DIAST BP <80 MM HG: CPT | Performed by: FAMILY MEDICINE

## 2023-12-21 PROCEDURE — 3074F SYST BP LT 130 MM HG: CPT | Performed by: FAMILY MEDICINE

## 2023-12-21 PROCEDURE — 99214 OFFICE O/P EST MOD 30 MIN: CPT | Performed by: FAMILY MEDICINE

## 2023-12-21 ASSESSMENT — FIBROSIS 4 INDEX: FIB4 SCORE: 0.5

## 2023-12-29 PROBLEM — Z09 HOSPITAL DISCHARGE FOLLOW-UP: Status: ACTIVE | Noted: 2023-12-29

## 2023-12-29 NOTE — PROGRESS NOTES
Subjective:   Fallon Richardson is a 43 y.o. female here today for evaluation and management of:     Hospital discharge follow-up  Patient was admitted at [facility] for 2 days in September for chest pain.   Note from hospital stay:   Patient had echocardiogram and stress test that were negative, patient was evaluated by speech therapy and patient had swallow eval, patient will need to follow-up with GI, patient probably having GERD, patient started on PPI, patient is feeling much better she is alert oriented follows commands, she also was found to have anemia likely due to iron deficiency, patient was started on IV iron and she is going to continue on oral iron upon discharge.     She had some vision changes.     Follow up labs, ref to GI, ref to ophthalmology provided.                Current medicines (including changes today)  Current Outpatient Medications   Medication Sig Dispense Refill    omeprazole (PRILOSEC) 20 MG delayed-release capsule Take 1 Capsule by mouth every day. 30 Capsule 0     No current facility-administered medications for this visit.     She  has a past medical history of Headache(784.0).    ROS  No chest pain, no shortness of breath, no abdominal pain       Objective:     /78 (BP Location: Left arm, Patient Position: Sitting, BP Cuff Size: Adult)   Pulse 100   Temp 36.4 °C (97.5 °F) (Temporal)   Resp 18   Ht 1.524 m (5')   Wt 69.4 kg (153 lb)   SpO2 100%  Body mass index is 29.88 kg/m².   Physical Exam:  Constitutional: Alert, no distress.  Skin: Warm, dry, good turgor, no rashes in visible areas.  Eye: Equal, round and reactive, conjunctiva clear, lids normal.  ENMT: Lips without lesions, good dentition, oropharynx clear.  Neck: Trachea midline, no masses, no thyromegaly. No cervical or supraclavicular lymphadenopathy  Respiratory: Unlabored respiratory effort, lungs clear to auscultation, no wheezes, no ronchi.  Cardiovascular: Normal S1, S2, no murmur, no edema.  Abdomen:  Soft, non-tender, no masses, no hepatosplenomegaly.  Psych: Alert and oriented x3, normal affect and mood.        Assessment and Plan:   The following treatment plan was discussed    1. Anemia, unspecified type  - CBC WITH DIFFERENTIAL; Future  - IRON/TOTAL IRON BIND; Future  - FERRITIN; Future    2. Dysmenorrhea  - Comp Metabolic Panel; Future  - US-PELVIC COMPLETE (TRANSABDOMINAL/TRANSVAGINAL) (COMBO); Future  - TSH WITH REFLEX TO FT4; Future  - Referral to OB/Gyn    3. Elevated fasting glucose  - HEMOGLOBIN A1C; Future    4. Dysphagia, unspecified type  - Referral to Gastroenterology    5. Vision changes  - Referral to Ophthalmology    6. Hospital discharge follow-up      Followup: Return in about 3 months (around 3/21/2024).

## 2023-12-29 NOTE — ASSESSMENT & PLAN NOTE
Patient was admitted at [facility] for 2 days in September for chest pain.   Note from hospital stay:   Patient had echocardiogram and stress test that were negative, patient was evaluated by speech therapy and patient had swallow eval, patient will need to follow-up with GI, patient probably having GERD, patient started on PPI, patient is feeling much better she is alert oriented follows commands, she also was found to have anemia likely due to iron deficiency, patient was started on IV iron and she is going to continue on oral iron upon discharge.     She had some vision changes.     Follow up labs, ref to GI, ref to ophthalmology provided.

## 2024-02-02 ENCOUNTER — HOSPITAL ENCOUNTER (OUTPATIENT)
Dept: RADIOLOGY | Facility: MEDICAL CENTER | Age: 44
End: 2024-02-02
Attending: FAMILY MEDICINE
Payer: COMMERCIAL

## 2024-02-02 DIAGNOSIS — N94.6 DYSMENORRHEA: ICD-10-CM

## 2024-02-02 PROCEDURE — 76830 TRANSVAGINAL US NON-OB: CPT

## 2024-02-09 NOTE — RESULT ENCOUNTER NOTE
Please let pt know only small fibroids seen in the uterus. These could be the cause of the pain with her periods. I will discuss treatment at her next visit.   Celso Jolley M.D.

## 2024-04-07 ENCOUNTER — OFFICE VISIT (OUTPATIENT)
Dept: URGENT CARE | Facility: PHYSICIAN GROUP | Age: 44
End: 2024-04-07
Payer: COMMERCIAL

## 2024-04-07 VITALS
RESPIRATION RATE: 18 BRPM | DIASTOLIC BLOOD PRESSURE: 64 MMHG | WEIGHT: 148 LBS | SYSTOLIC BLOOD PRESSURE: 116 MMHG | OXYGEN SATURATION: 99 % | HEART RATE: 87 BPM | BODY MASS INDEX: 29.06 KG/M2 | HEIGHT: 60 IN | TEMPERATURE: 97.1 F

## 2024-04-07 DIAGNOSIS — N92.1 MENORRHAGIA WITH IRREGULAR CYCLE: ICD-10-CM

## 2024-04-07 DIAGNOSIS — D64.9 ANEMIA, UNSPECIFIED TYPE: ICD-10-CM

## 2024-04-07 PROCEDURE — 99214 OFFICE O/P EST MOD 30 MIN: CPT | Performed by: PHYSICIAN ASSISTANT

## 2024-04-07 PROCEDURE — 3078F DIAST BP <80 MM HG: CPT | Performed by: PHYSICIAN ASSISTANT

## 2024-04-07 PROCEDURE — 3074F SYST BP LT 130 MM HG: CPT | Performed by: PHYSICIAN ASSISTANT

## 2024-04-07 RX ORDER — POLYETHYLENE GLYCOL 3350, SODIUM SULFATE ANHYDROUS, SODIUM BICARBONATE, SODIUM CHLORIDE, POTASSIUM CHLORIDE 236; 22.74; 6.74; 5.86; 2.97 G/4L; G/4L; G/4L; G/4L; G/4L
POWDER, FOR SOLUTION ORAL
COMMUNITY
Start: 2024-02-29 | End: 2024-04-16

## 2024-04-07 ASSESSMENT — FIBROSIS 4 INDEX: FIB4 SCORE: 0.5

## 2024-04-07 NOTE — LETTER
April 7, 2024         Patient: Fallon Richardson   YOB: 1980   Date of Visit: 4/7/2024           To Whom it May Concern:    Fallon Richardson was seen in my clinic on 4/7/2024. Please excuse any absences from work this week due to acute condition.      If you have any questions or concerns, please don't hesitate to call.        Sincerely,           Tavo Euceda P.A.-C.  Electronically Signed

## 2024-04-08 ENCOUNTER — HOSPITAL ENCOUNTER (OUTPATIENT)
Dept: LAB | Facility: MEDICAL CENTER | Age: 44
End: 2024-04-08
Attending: FAMILY MEDICINE
Payer: COMMERCIAL

## 2024-04-08 DIAGNOSIS — R73.01 ELEVATED FASTING GLUCOSE: ICD-10-CM

## 2024-04-08 DIAGNOSIS — N94.6 DYSMENORRHEA: ICD-10-CM

## 2024-04-08 DIAGNOSIS — D64.9 ANEMIA, UNSPECIFIED TYPE: ICD-10-CM

## 2024-04-08 LAB
ALBUMIN SERPL BCP-MCNC: 4.2 G/DL (ref 3.2–4.9)
ALBUMIN/GLOB SERPL: 1.2 G/DL
ALP SERPL-CCNC: 88 U/L (ref 30–99)
ALT SERPL-CCNC: 34 U/L (ref 2–50)
ANION GAP SERPL CALC-SCNC: 10 MMOL/L (ref 7–16)
AST SERPL-CCNC: 21 U/L (ref 12–45)
BASOPHILS # BLD AUTO: 0.5 % (ref 0–1.8)
BASOPHILS # BLD: 0.05 K/UL (ref 0–0.12)
BILIRUB SERPL-MCNC: 0.2 MG/DL (ref 0.1–1.5)
BUN SERPL-MCNC: 16 MG/DL (ref 8–22)
CALCIUM ALBUM COR SERPL-MCNC: 8.7 MG/DL (ref 8.5–10.5)
CALCIUM SERPL-MCNC: 8.9 MG/DL (ref 8.5–10.5)
CHLORIDE SERPL-SCNC: 104 MMOL/L (ref 96–112)
CO2 SERPL-SCNC: 25 MMOL/L (ref 20–33)
CREAT SERPL-MCNC: 0.52 MG/DL (ref 0.5–1.4)
EOSINOPHIL # BLD AUTO: 0.09 K/UL (ref 0–0.51)
EOSINOPHIL NFR BLD: 0.9 % (ref 0–6.9)
ERYTHROCYTE [DISTWIDTH] IN BLOOD BY AUTOMATED COUNT: 45.6 FL (ref 35.9–50)
EST. AVERAGE GLUCOSE BLD GHB EST-MCNC: 123 MG/DL
FERRITIN SERPL-MCNC: 7.2 NG/ML (ref 10–291)
GFR SERPLBLD CREATININE-BSD FMLA CKD-EPI: 118 ML/MIN/1.73 M 2
GLOBULIN SER CALC-MCNC: 3.4 G/DL (ref 1.9–3.5)
GLUCOSE SERPL-MCNC: 108 MG/DL (ref 65–99)
HBA1C MFR BLD: 5.9 % (ref 4–5.6)
HCT VFR BLD AUTO: 38.6 % (ref 37–47)
HGB BLD-MCNC: 12.4 G/DL (ref 12–16)
IMM GRANULOCYTES # BLD AUTO: 0.02 K/UL (ref 0–0.11)
IMM GRANULOCYTES NFR BLD AUTO: 0.2 % (ref 0–0.9)
IRON SATN MFR SERPL: 6 % (ref 15–55)
IRON SERPL-MCNC: 21 UG/DL (ref 40–170)
LYMPHOCYTES # BLD AUTO: 2.16 K/UL (ref 1–4.8)
LYMPHOCYTES NFR BLD: 22.4 % (ref 22–41)
MCH RBC QN AUTO: 26.9 PG (ref 27–33)
MCHC RBC AUTO-ENTMCNC: 32.1 G/DL (ref 32.2–35.5)
MCV RBC AUTO: 83.7 FL (ref 81.4–97.8)
MONOCYTES # BLD AUTO: 0.63 K/UL (ref 0–0.85)
MONOCYTES NFR BLD AUTO: 6.5 % (ref 0–13.4)
NEUTROPHILS # BLD AUTO: 6.7 K/UL (ref 1.82–7.42)
NEUTROPHILS NFR BLD: 69.5 % (ref 44–72)
NRBC # BLD AUTO: 0 K/UL
NRBC BLD-RTO: 0 /100 WBC (ref 0–0.2)
PLATELET # BLD AUTO: 370 K/UL (ref 164–446)
PMV BLD AUTO: 12.8 FL (ref 9–12.9)
POTASSIUM SERPL-SCNC: 4.7 MMOL/L (ref 3.6–5.5)
PROT SERPL-MCNC: 7.6 G/DL (ref 6–8.2)
RBC # BLD AUTO: 4.61 M/UL (ref 4.2–5.4)
SODIUM SERPL-SCNC: 139 MMOL/L (ref 135–145)
TIBC SERPL-MCNC: 360 UG/DL (ref 250–450)
TSH SERPL DL<=0.005 MIU/L-ACNC: 0.55 UIU/ML (ref 0.38–5.33)
UIBC SERPL-MCNC: 339 UG/DL (ref 110–370)
WBC # BLD AUTO: 9.7 K/UL (ref 4.8–10.8)

## 2024-04-08 PROCEDURE — 83540 ASSAY OF IRON: CPT

## 2024-04-08 PROCEDURE — 85025 COMPLETE CBC W/AUTO DIFF WBC: CPT

## 2024-04-08 PROCEDURE — 36415 COLL VENOUS BLD VENIPUNCTURE: CPT

## 2024-04-08 PROCEDURE — 82728 ASSAY OF FERRITIN: CPT

## 2024-04-08 PROCEDURE — 83550 IRON BINDING TEST: CPT

## 2024-04-08 PROCEDURE — 80053 COMPREHEN METABOLIC PANEL: CPT

## 2024-04-08 PROCEDURE — 84443 ASSAY THYROID STIM HORMONE: CPT

## 2024-04-08 PROCEDURE — 83036 HEMOGLOBIN GLYCOSYLATED A1C: CPT

## 2024-04-16 ENCOUNTER — OFFICE VISIT (OUTPATIENT)
Dept: MEDICAL GROUP | Facility: PHYSICIAN GROUP | Age: 44
End: 2024-04-16
Payer: COMMERCIAL

## 2024-04-16 VITALS
SYSTOLIC BLOOD PRESSURE: 110 MMHG | RESPIRATION RATE: 15 BRPM | TEMPERATURE: 97.6 F | WEIGHT: 147 LBS | BODY MASS INDEX: 28.86 KG/M2 | OXYGEN SATURATION: 99 % | DIASTOLIC BLOOD PRESSURE: 78 MMHG | HEIGHT: 60 IN | HEART RATE: 83 BPM

## 2024-04-16 DIAGNOSIS — Z12.31 ENCOUNTER FOR SCREENING MAMMOGRAM FOR MALIGNANT NEOPLASM OF BREAST: ICD-10-CM

## 2024-04-16 DIAGNOSIS — N92.1 MENORRHAGIA WITH IRREGULAR CYCLE: ICD-10-CM

## 2024-04-16 DIAGNOSIS — F51.01 PRIMARY INSOMNIA: ICD-10-CM

## 2024-04-16 DIAGNOSIS — R73.03 PREDIABETES: ICD-10-CM

## 2024-04-16 PROCEDURE — 99214 OFFICE O/P EST MOD 30 MIN: CPT | Performed by: FAMILY MEDICINE

## 2024-04-16 PROCEDURE — 3078F DIAST BP <80 MM HG: CPT | Performed by: FAMILY MEDICINE

## 2024-04-16 PROCEDURE — 3074F SYST BP LT 130 MM HG: CPT | Performed by: FAMILY MEDICINE

## 2024-04-16 RX ORDER — ONDANSETRON 4 MG/1
TABLET, FILM COATED ORAL
COMMUNITY
Start: 2024-04-14

## 2024-04-16 RX ORDER — OMEPRAZOLE 40 MG/1
40 CAPSULE, DELAYED RELEASE ORAL DAILY
COMMUNITY
Start: 2024-04-14

## 2024-04-16 RX ORDER — AZITHROMYCIN 250 MG/1
TABLET, FILM COATED ORAL
COMMUNITY
Start: 2024-04-14

## 2024-04-16 ASSESSMENT — FIBROSIS 4 INDEX: FIB4 SCORE: 0.42

## 2024-04-16 NOTE — PROGRESS NOTES
Subjective:   Fallon Richardson is a 43 y.o. female here today, with her close friend who helps with translation, for evaluation and management of:     Menorrhagia with irregular cycle  Patient has some months with little or no menstrual bleeding and about every 3 months has severe heavy menstrual bleeding needed to come to the  once and had anemia.   She tried OCP in the past but could not tolerate due to significant nausea.   Ref to ob/gyn to consider treatment like ablation.   Cbc, iron ordered.   Pelvic US showed small fibroids, this could be the cause of the pelvic pain with periods,   She also gets breast pain with her periods.     Primary insomnia  Patient has restlessness and unable to sleep.   Eg when she sleep 10 min, she then is up all night.   She goes to the gym but it does not help.   She works during the day.   Does not drink caffeine or alcohol.   Does not eat heavy foods close to dinner.   Has normal tsh, cbc, cmp    Prediabetes  A1c is 5.9  Advised patient this is prediabetes range.   Encouraged to avoid sugars, refined carbohydrates.   Continue regular exercise and activity.          Current medicines (including changes today)  Current Outpatient Medications   Medication Sig Dispense Refill    azithromycin (ZITHROMAX) 250 MG Tab TAKE 1 TABLET BY MOUTH ONCE DAILY FOR 4 DAYS      ondansetron (ZOFRAN) 4 MG Tab tablet TAKE 1 TABLET BY MOUTH EVERY 6 HOURS FOR 3 DAYS      omeprazole (PRILOSEC) 40 MG delayed-release capsule Take 40 mg by mouth every day.       No current facility-administered medications for this visit.     She  has a past medical history of Headache(784.0).    ROS  No chest pain, no shortness of breath, no abdominal pain       Objective:     /78   Pulse 83   Temp 36.4 °C (97.6 °F) (Temporal)   Resp 15   Ht 1.524 m (5')   Wt 66.7 kg (147 lb)   SpO2 99%  Body mass index is 28.71 kg/m².   Physical Exam:  Constitutional: Alert, no distress.  Skin: Warm, dry, good turgor,  no rashes in visible areas.  Eye: Equal, round and reactive, conjunctiva clear, lids normal.  ENMT: Lips without lesions, good dentition, oropharynx clear.  Neck: Trachea midline, no masses, no thyromegaly. No cervical or supraclavicular lymphadenopathy  Respiratory: Unlabored respiratory effort, lungs clear to auscultation, no wheezes, no ronchi.  Cardiovascular: Normal S1, S2, no murmur, no edema.  Abdomen: Soft, non-tender, no masses, no hepatosplenomegaly.  Psych: Alert and oriented x3, normal affect and mood.    Assessment and Plan:   The following treatment plan was discussed    1. Menorrhagia with irregular cycle  - Referral to OB/Gyn  - CBC WITH DIFFERENTIAL; Future  - IRON/TOTAL IRON BIND; Future  - FERRITIN; Future    2. Primary insomnia    3. Prediabetes  - HEMOGLOBIN A1C; Future    4. Encounter for screening mammogram for malignant neoplasm of breast  - MA-SCREENING MAMMO BILAT W/TOMOSYNTHESIS W/CAD; Future    Other orders  - azithromycin (ZITHROMAX) 250 MG Tab; TAKE 1 TABLET BY MOUTH ONCE DAILY FOR 4 DAYS  - ondansetron (ZOFRAN) 4 MG Tab tablet; TAKE 1 TABLET BY MOUTH EVERY 6 HOURS FOR 3 DAYS  - omeprazole (PRILOSEC) 40 MG delayed-release capsule; Take 40 mg by mouth every day.  - Obtain Results: Other (see comment) (PAP); Obtain Results From: Other (see comment) ([facility])      Followup: Return in about 6 months (around 10/16/2024) for Lab Review.

## 2024-04-16 NOTE — LETTER
[facility]  Celso Jolley M.D.  [address] [place] [address]  Fax: [phone]   Authorization for Release/Disclosure of   Protected Health Information   Name: SASCHA MORALES : 1980 SSN: [id number]   Address: [place] [address] Phone:    [phone] (home) [phone] (work)   I authorize the entity listed below to release/disclose the PHI below to:   [facility]/Celso Jolley M.D. and Celso Jolley M.D.   Provider or Entity Name:  ([facility] Nurse      Address   [address]                    [address]                    [place] [address]   [phone]      Fax [phone]     Reason for request: continuity of care   Information to be released:    [  ] LAST COLONOSCOPY,  including any PATH REPORT and follow-up  [  ] LAST FIT/COLOGUARD RESULT [  ] LAST DEXA  [  ] LAST MAMMOGRAM  [  ] LAST PAP  [  ] LAST LABS [  ] RETINA EXAM REPORT  [  ] IMMUNIZATION RECORDS  [x] Release all info      [  ] Check here and initial the line next to each item to release ALL health information INCLUDING  _____ Care and treatment for drug and / or alcohol abuse  _____ HIV testing, infection status, or AIDS  _____ Genetic Testing    DATES OF SERVICE OR TIME PERIOD TO BE DISCLOSED: _____________  I understand and acknowledge that:  * This Authorization may be revoked at any time by you in writing, except if your health information has already been used or disclosed.  * Your health information that will be used or disclosed as a result of you signing this authorization could be re-disclosed by the recipient. If this occurs, your re-disclosed health information may no longer be protected by State or Federal laws.  * You may refuse to sign this Authorization. Your refusal will not affect your ability to obtain treatment.  * This Authorization becomes effective upon signing and will  on (date) __________.      If no date is indicated,  this Authorization will  one (1) year from the signature date.    Name: Fallon Richardson  Signature: Date:   2024     PLEASE FAX REQUESTED RECORDS BACK TO: [phone]

## 2024-04-16 NOTE — ASSESSMENT & PLAN NOTE
Patient has restlessness and unable to sleep.   Eg when she sleep 10 min, she then is up all night.   She goes to the gym but it does not help.   She works during the day.   Does not drink caffeine or alcohol.   Does not eat heavy foods close to dinner.   Has normal tsh, cbc, cmp

## 2024-04-16 NOTE — ASSESSMENT & PLAN NOTE
A1c is 5.9  Advised patient this is prediabetes range.   Encouraged to avoid sugars, refined carbohydrates.   Continue regular exercise and activity.

## 2024-04-16 NOTE — ASSESSMENT & PLAN NOTE
Patient has some months with little or no menstrual bleeding and about every 3 months has severe heavy menstrual bleeding needed to come to the UC once and had anemia.   She tried OCP in the past but could not tolerate due to significant nausea.   Ref to ob/gyn to consider treatment like ablation.   Cbc, iron ordered.   Pelvic US showed small fibroids, this could be the cause of the pelvic pain with periods,   She also gets breast pain with her periods.

## 2024-04-26 ASSESSMENT — ENCOUNTER SYMPTOMS
CONSTITUTIONAL NEGATIVE: 1
FLANK PAIN: 0
GASTROINTESTINAL NEGATIVE: 1
CARDIOVASCULAR NEGATIVE: 1
RESPIRATORY NEGATIVE: 1

## 2024-04-26 NOTE — PROGRESS NOTES
Subjective     Fallon Richardson is a very pleasant 43 y.o. female who presents with Vaginal Bleeding (Mass amount of blood coming out x 2 months. /Sharp pelvic pain /Has PCP appt on 4/23 /Recent blood work and U/S done showing fibroids. )            HPI  Patient presenting with abnormal menstrual cycles for the past 2 to 3 months.  Intermittent heavy menses.  Constant slight bleeding.  She has been evaluated by PCP.  Ultrasound showed fibroids.  She has had anemia in the past.  Patient denies fever, UTI symptoms, vaginitis, abdominal pain.  She has tried OCP in the past but did not tolerate secondary to nausea.  Patient has an appointment in 2 weeks with her PCP.    PMH:  has a past medical history of Headache(784.0).  MEDS:   Current Outpatient Medications:     azithromycin (ZITHROMAX) 250 MG Tab, TAKE 1 TABLET BY MOUTH ONCE DAILY FOR 4 DAYS, Disp: , Rfl:     ondansetron (ZOFRAN) 4 MG Tab tablet, TAKE 1 TABLET BY MOUTH EVERY 6 HOURS FOR 3 DAYS, Disp: , Rfl:     omeprazole (PRILOSEC) 40 MG delayed-release capsule, Take 40 mg by mouth every day., Disp: , Rfl:   ALLERGIES: No Known Allergies  SURGHX: No past surgical history on file.  SOCHX:  reports that she has never smoked. She has never used smokeless tobacco. She reports that she does not drink alcohol and does not use drugs.  FH: family history includes Cancer in her maternal grandfather and maternal grandmother; Diabetes in her mother; Hyperlipidemia in her mother.    Review of Systems   Constitutional: Negative.    HENT: Negative.     Respiratory: Negative.     Cardiovascular: Negative.    Gastrointestinal: Negative.    Genitourinary:  Negative for dysuria, flank pain, frequency, hematuria and urgency.        Pelvic pain with abnormal menses       Medications, Allergies, and current problem list reviewed today in Epic           Objective     /64   Pulse 87   Temp 36.2 °C (97.1 °F) (Temporal)   Resp 18   Ht 1.524 m (5')   Wt 67.1 kg (148 lb)    SpO2 99%   BMI 28.90 kg/m²      Physical Exam  Vitals and nursing note reviewed.   Constitutional:       General: She is not in acute distress.     Appearance: Normal appearance. She is not ill-appearing, toxic-appearing or diaphoretic.   HENT:      Head: Normocephalic and atraumatic.      Right Ear: External ear normal.      Left Ear: External ear normal.      Nose: Nose normal.   Eyes:      Conjunctiva/sclera: Conjunctivae normal.   Cardiovascular:      Rate and Rhythm: Normal rate and regular rhythm.      Heart sounds: Normal heart sounds.   Pulmonary:      Effort: Pulmonary effort is normal. No respiratory distress.      Breath sounds: Normal breath sounds. No wheezing, rhonchi or rales.   Abdominal:      General: Abdomen is flat. There is no distension.      Palpations: Abdomen is soft.      Tenderness: There is no abdominal tenderness. There is no right CVA tenderness, left CVA tenderness, guarding or rebound.      Comments: No suprapubic tenderness   Genitourinary:     Comments: Defer  Musculoskeletal:      Cervical back: Normal range of motion and neck supple.   Skin:     General: Skin is warm and dry.      Capillary Refill: Capillary refill takes less than 2 seconds.      Coloration: Skin is not pale.   Neurological:      General: No focal deficit present.      Mental Status: She is alert and oriented to person, place, and time. Mental status is at baseline.      Sensory: No sensory deficit.   Psychiatric:         Mood and Affect: Mood normal.         Behavior: Behavior normal.         Thought Content: Thought content normal.         Judgment: Judgment normal.                             Assessment & Plan     This is a very pleasant 43-year-old female presenting with ongoing metromenorrhagia for the past 2 to 3 months.  Has had workup via her PCP showing fibroids and anemia.  Previously treated with OCP but did not tolerate secondary to side effects.  She has been having increasing bleeding and is  worried about recurrence of her anemia.  She is currently not on treatment.  She denies fever, abdominal pain, vaginitis or UTI symptoms.  Vital signs normal, abdominal exam benign.  Patient defers pelvic exam.  No overt signs of anemia on exam.  Subsequent CBC within normal limits.  Thorough review of her chart does show uterine fibroids which could be the etiology of her pelvic pain.  Patient clinically stable today.  She has follow-up with PCP in 2 weeks and therefore defers any further workup or referral today from urgent care.         1. Menorrhagia with irregular cycle  CBC WITH DIFFERENTIAL      2. Anemia, unspecified type  CBC WITH DIFFERENTIAL          I personally reviewed prior external notes and test results pertinent to today's visit. Return to clinic or go to ED if symptoms worsen or persist. Red flag symptoms and indications for ED discussed at length. Patient/Parent/Guardian voices understanding.  AVS with post-visit instructions printed and provided or given verbally.  Follow-up with your primary care provider in 3-5 days. All side effects and potential interactions of prescribed medication discussed including allergic response, GI upset, tendon injury, rash, sedation, OCP effectiveness, etc.    Please note that this dictation was created using voice recognition software. I have made every reasonable attempt to correct obvious errors, but I expect that there are errors of grammar and possibly content that I did not discover before finalizing the note.

## 2024-06-02 ENCOUNTER — OCCUPATIONAL MEDICINE (OUTPATIENT)
Dept: URGENT CARE | Facility: PHYSICIAN GROUP | Age: 44
End: 2024-06-02
Payer: COMMERCIAL

## 2024-06-02 ENCOUNTER — APPOINTMENT (OUTPATIENT)
Dept: RADIOLOGY | Facility: IMAGING CENTER | Age: 44
End: 2024-06-02
Attending: PHYSICIAN ASSISTANT
Payer: COMMERCIAL

## 2024-06-02 VITALS
HEART RATE: 89 BPM | WEIGHT: 147 LBS | BODY MASS INDEX: 28.86 KG/M2 | HEIGHT: 60 IN | TEMPERATURE: 97.2 F | SYSTOLIC BLOOD PRESSURE: 110 MMHG | OXYGEN SATURATION: 98 % | DIASTOLIC BLOOD PRESSURE: 72 MMHG | RESPIRATION RATE: 16 BRPM

## 2024-06-02 DIAGNOSIS — S89.92XA INJURY OF LEFT LOWER LEG, INITIAL ENCOUNTER: ICD-10-CM

## 2024-06-02 DIAGNOSIS — S80.12XA CONTUSION OF LEFT LOWER LEG, INITIAL ENCOUNTER: ICD-10-CM

## 2024-06-02 PROCEDURE — 99214 OFFICE O/P EST MOD 30 MIN: CPT | Performed by: PHYSICIAN ASSISTANT

## 2024-06-02 PROCEDURE — 3078F DIAST BP <80 MM HG: CPT | Performed by: PHYSICIAN ASSISTANT

## 2024-06-02 PROCEDURE — 73590 X-RAY EXAM OF LOWER LEG: CPT | Mod: TC,FY,LT | Performed by: PHYSICIAN ASSISTANT

## 2024-06-02 PROCEDURE — 3074F SYST BP LT 130 MM HG: CPT | Performed by: PHYSICIAN ASSISTANT

## 2024-06-02 ASSESSMENT — FIBROSIS 4 INDEX: FIB4 SCORE: 0.42

## 2024-06-02 NOTE — PROGRESS NOTES
Subjective     Fallon Richardson is a 4 very pleasant 3 y.o. female who presents with Ankle Injury (New w/c Dropped box on ankle at work 2 wks ago. DOI 5/22. Painful lump on leg okay while walking. Standing or laying down has a throbbing pain at site. )            HPI  Direct impact injury to the medial left lower leg via the corner of a box.  There is been continued pain and swelling after 2 weeks.  Tylenol has been helpful.  No previous injury.  No second job.  Burning sensation at night.  No calf tenderness, swelling or deformity      ROS           Objective     /72   Pulse 89   Temp 36.2 °C (97.2 °F) (Temporal)   Resp 16   Ht 1.524 m (5')   Wt 66.7 kg (147 lb)   SpO2 98%   BMI 28.71 kg/m²      Physical Exam  Musculoskeletal:        Legs:       Comments: Palpable area of swelling and tenderness of the medial left lower leg with an obvious hematoma.  No erythema or signs of infection.  Full passive and active range of motion.  Gait normal.                             Assessment & Plan        1. Injury of left lower leg, initial encounter  DX-TIBIA AND FIBULA LEFT      2. Contusion of left lower leg, initial encounter          X-ray negative.  Contusion with developing hematoma.  RICE therapy and OTC meds.  Follow-up in 1 week.  Vitals normal.  No calf tenderness/swelling, infectious symptoms, or red flag symptoms.      Return to clinic or go to ED if symptoms worsen or persist. Red flag symptoms and indications for ED discussed at length. Patient voices understanding. All side effects and potential interactions of medication discussed including allergic response, GI upset, sedation, etc.    Please note that this dictation was created using voice recognition software. I have made every reasonable attempt to correct obvious errors, but I expect that there are errors of grammar and possibly content that I did not discover before finalizing the note.

## 2024-08-24 ENCOUNTER — APPOINTMENT (OUTPATIENT)
Dept: RADIOLOGY | Facility: IMAGING CENTER | Age: 44
End: 2024-08-24
Attending: FAMILY MEDICINE
Payer: COMMERCIAL

## 2024-08-24 ENCOUNTER — OCCUPATIONAL MEDICINE (OUTPATIENT)
Dept: URGENT CARE | Facility: PHYSICIAN GROUP | Age: 44
End: 2024-08-24
Payer: COMMERCIAL

## 2024-08-24 ENCOUNTER — NON-PROVIDER VISIT (OUTPATIENT)
Dept: URGENT CARE | Facility: PHYSICIAN GROUP | Age: 44
End: 2024-08-24
Payer: COMMERCIAL

## 2024-08-24 VITALS
RESPIRATION RATE: 18 BRPM | HEIGHT: 60 IN | DIASTOLIC BLOOD PRESSURE: 62 MMHG | HEART RATE: 86 BPM | BODY MASS INDEX: 30.43 KG/M2 | WEIGHT: 155 LBS | TEMPERATURE: 97.4 F | SYSTOLIC BLOOD PRESSURE: 108 MMHG | OXYGEN SATURATION: 99 %

## 2024-08-24 DIAGNOSIS — M79.671 RIGHT FOOT PAIN: ICD-10-CM

## 2024-08-24 DIAGNOSIS — Z02.1 PRE-EMPLOYMENT DRUG SCREENING: ICD-10-CM

## 2024-08-24 DIAGNOSIS — S90.31XA CONTUSION OF RIGHT HEEL, INITIAL ENCOUNTER: ICD-10-CM

## 2024-08-24 DIAGNOSIS — Z02.83 ENCOUNTER FOR DRUG SCREENING: ICD-10-CM

## 2024-08-24 PROBLEM — Z09 HOSPITAL DISCHARGE FOLLOW-UP: Status: RESOLVED | Noted: 2023-12-29 | Resolved: 2024-08-24

## 2024-08-24 PROCEDURE — 3078F DIAST BP <80 MM HG: CPT | Performed by: FAMILY MEDICINE

## 2024-08-24 PROCEDURE — 73630 X-RAY EXAM OF FOOT: CPT | Mod: TC,FY,RT | Performed by: FAMILY MEDICINE

## 2024-08-24 PROCEDURE — 80305 DRUG TEST PRSMV DIR OPT OBS: CPT | Performed by: FAMILY MEDICINE

## 2024-08-24 PROCEDURE — 82075 ASSAY OF BREATH ETHANOL: CPT | Performed by: FAMILY MEDICINE

## 2024-08-24 PROCEDURE — 3074F SYST BP LT 130 MM HG: CPT | Performed by: FAMILY MEDICINE

## 2024-08-24 PROCEDURE — 99213 OFFICE O/P EST LOW 20 MIN: CPT | Performed by: FAMILY MEDICINE

## 2024-08-24 ASSESSMENT — FIBROSIS 4 INDEX: FIB4 SCORE: 0.43

## 2024-08-24 NOTE — LETTER
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 No     Explain:        Objective Medical Findings: No discolorations or deformities noted inspection of right foot.  She is tender to palpation.  Previous.  No pain to palpation of lateral/medial malleolus or.  She is able to plantar dorsiflex against resistance.  Antalgic gait.         None - Discharged                         Stable  No                 Ratable  No        Generally Improved                         Condition Worsened                  Condition Same  May Have Suffered a Permanent Disability No     Treatment Plan:    -Work restrictions include maximum 4 hours of walking/standing.  No climbing.  -Tylenol and ibuprofen as needed for symptomatic relief.         No Change in Therapy                  PT/OT Prescribed                      Medication May be Used While Working        Case Management                          PT/OT Discontinued    Consultation    Further Diagnostic Studies:    Prescription(s)                 Released to FULL DUTY /No Restrictions on (Date):  From:      Certified TOTALLY TEMPORARILY DISABLED (Indicate Dates) From:   To:    X  Released to RESTRICTED/Modified Duty on (Date): From:   To:    Restrictions Are:  Temporary      No Sitting    No Standing X   No Pulling Other: -Maximum 4 hours walking/standing total       No Bending at Waist     No Stooping     No Lifting        No Carrying     No Walking Lifting Restricted to (lbs.):           No Pushing     X   No Climbing     No Reaching Above Shoulders       Date of Next Visit:  9/01/2024 Date of this Exam: 8/24/2024 Physician/Chiropractic Physician Name: Nahomy Ny M.D. Physician/Chiropractic Physician Signature:  Avni Artis DO MPH                                                                                                                                                                                                            D-39 (Rev. 2/24)

## 2024-08-24 NOTE — PROGRESS NOTES
Subjective:     Fallon Richardson is a 44 y.o. female who presents for Other (WC DOI 8/23/24/R foot injury, stepped on bolts )    DOI: 8/23/2024  MARCIO: She was at work checking supplies when she accidentally stepped on a bolt. It did not go through her shoe. She has had intermittent heel/ankle pain since then, it comes with weight bearing, it's described stabbing, currently rated 0/10 at it's worse is 9/10. She has tried tylenol with some relief in symptoms. She denies prior right foot injury.  No secondary employment.       Objective:     /62   Pulse 86   Temp 36.3 °C (97.4 °F) (Temporal)   Resp 18   Ht 1.524 m (5')   Wt 70.3 kg (155 lb)   SpO2 99%   BMI 30.27 kg/m²     No discolorations or deformities noted inspection of right foot.  She is tender to palpation.  Previous.  No pain to palpation of lateral/medial malleolus or.  She is able to plantar dorsiflex against resistance.  Antalgic gait.    Assessment/Plan:     1. Right foot pain  - DX-FOOT-COMPLETE 3+ RIGHT; Future    2. Contusion of right heel, initial encounter      -Work restrictions include maximum 4 hours of walking/standing.  No climbing.  -Tylenol and ibuprofen as needed for symptomatic relief.

## 2024-08-24 NOTE — LETTER
EMPLOYEE’S CLAIM FOR COMPENSATION/ REPORT OF INITIAL TREATMENT  FORM C-4  PLEASE TYPE OR PRINT    EMPLOYEE’S CLAIM - PROVIDE ALL INFORMATION REQUESTED   First Name                    URIEL Lehman                  Last Name  Danna Richardson Birthdate                    1980                Sex  Female Claim Number (Insurer’s Use Only)     Home Address  [address] Age  44 y.o. 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 (if applicable)  working    How did this injury or occupational disease occur? (Be specific and answer in detail. Use additional sheet if necessary)  checking material unitl I step on nails on the ground   If you believe that you have an occupational disease, when did you first have knowledge of the disability and its relationship to your employment?  n/a Witnesses to the Accident (if applicable)  n/a      Nature of Injury or Occupational Disease  Workers' Compensation  Part(s) of Body Injured or Affected  Foot (R) N/A N/A    I CERTIFY THAT THE ABOVE IS TRUE  AND CORRECT TO T HE BEST OF MY KNOWLEDGE AND THAT I HAVE PROVIDED THIS INFORMATION IN ORDER TO OBTAIN THE BENEFITS OF [place]  I HEREBY AUTHORIZE ANY PHYSICIAN, CHIROPRACTOR, SURGEON, PRACTITIONER OR ANY OTHER PERSON, ANY HOSPITAL, INCLUDING [facility] OR [facility], ANY  MEDICAL SERVICE ORGANIZATION, ANY INSURANCE COMPANY, OR OTHER INSTITUTION OR ORGANIZATION TO RELEASE TO EACH OTHER, ANY MEDICAL OR OTHER INFORMATION, INCLUDING BENEFITS PAID OR PAYABLE, PERTINENT TO THIS INJURY OR DISEASE, EXCEPT INFORMATION RELATIVE TO DIAGNOSIS, TREATMENT AND/OR COUNSELING FOR AIDS, PSYCHOLOGICAL CONDITIONS, ALCOHOL OR CONTROLLED SUBSTANCES, FOR WHICH I MUST GIVE SPECIFIC AUTHORIZATION.  A PHOTOSTAT OF THIS AUTHORIZATION SHALL BE VALID AS THE ORIGINAL.     Date 08/24/2024   [facility]  Employee’s Original or  *Electronic Signature   THIS REPORT MUST BE COMPLETED AND MAILED WITHIN 3 WORKING DAYS OF TREATMENT   Place  [facility]    Name of Facility  [facility]   Date 8/24/2024 Diagnosis and Description of Injury or Occupational Disease  (M79.671) Right foot pain  (S90.31XA) Contusion of right heel, initial encounter  Diagnoses of Right foot pain and Contusion of right heel, initial encounter were pertinent to this visit. Is there evidence that the injured employee was under the influence of alcohol and/or another controlled substance at the time of accident?  []No  [] Yes (if yes, please explain)   Hour 2:17 PM  No   Treatment: -Work restrictions include maximum 4 hours of walking/standing.  No climbing.  -Tylenol and ibuprofen as needed for symptomatic relief.    Have you advised the patient to remain off work five days or more?   [] Yes Indicate dates: From   To    [] No      If no, is the injured employee capable of: [] full duty [] modified duty                     If  modified duty, specify any limitations / restrictions:  maximum 4 hours of walking/standing.  No climbing.                                                                                                                                                                                                                                                                                                                                                                                                               X-Ray Findings: Negative  Comments:X-ray foot showing no acute osseous abnormality    From information given by the employee, together with medical evidence, can you directly connect this injury or occupational disease as job incurred?  []Yes   [] No Yes    Is additional medical care by a physician indicated? []Yes [] No  Yes    Do you know of any previous injury or disease contributing to this condition or occupational disease? []Yes [] No (Explain if yes)                          No   Date  8/24/2024 Print Health Care Provider’s Name  Verna Ny M.D. I certify that the employer’s copy of  this form was delivered to the employer on:   Address  [address] INSURER'S USE ONLY                       [facility]  [address] Provider’s Tax ID Number  [id number]   Telephone  Dept: [phone]    Health Care Provider’s Original or Electronic Signature  e-VERNA Santiago M.D. Degree (MD,DO, DC,PA-C,APRN)  MD  Choose (if applicable)      ORIGINAL - TREATING HEALTHCARE PROVIDER PAGE 2 - INSURER/TPA PAGE 3 - EMPLOYER PAGE 4 - EMPLOYEE             Form C-4 (rev.08/23)

## 2024-09-01 ENCOUNTER — OCCUPATIONAL MEDICINE (OUTPATIENT)
Dept: URGENT CARE | Facility: PHYSICIAN GROUP | Age: 44
End: 2024-09-01
Payer: COMMERCIAL

## 2024-09-01 VITALS
HEIGHT: 60 IN | WEIGHT: 152 LBS | DIASTOLIC BLOOD PRESSURE: 70 MMHG | BODY MASS INDEX: 29.84 KG/M2 | OXYGEN SATURATION: 97 % | HEART RATE: 95 BPM | RESPIRATION RATE: 16 BRPM | TEMPERATURE: 97.9 F | SYSTOLIC BLOOD PRESSURE: 110 MMHG

## 2024-09-01 DIAGNOSIS — S90.31XD CONTUSION OF RIGHT HEEL, SUBSEQUENT ENCOUNTER: ICD-10-CM

## 2024-09-01 PROCEDURE — 3074F SYST BP LT 130 MM HG: CPT | Performed by: FAMILY MEDICINE

## 2024-09-01 PROCEDURE — 3078F DIAST BP <80 MM HG: CPT | Performed by: FAMILY MEDICINE

## 2024-09-01 PROCEDURE — 99214 OFFICE O/P EST MOD 30 MIN: CPT | Performed by: FAMILY MEDICINE

## 2024-09-01 RX ORDER — PREDNISONE 20 MG/1
TABLET ORAL
Qty: 7 TABLET | Refills: 0 | Status: SHIPPED | OUTPATIENT
Start: 2024-09-01

## 2024-09-01 ASSESSMENT — FIBROSIS 4 INDEX: FIB4 SCORE: 0.43

## 2024-09-01 NOTE — LETTER
PHYSICIAN’S AND CHIROPRACTIC PHYSICIAN'S   PROGRESS REPORT CERTIFICATION OF DISABILITY Claim Number:     Social Security Number:    Patient’s Name: Fallon Richardson Date of Injury: 8/23/2024   Employer: [employer] Name of MCO (if applicable):      Patient’s Job Description/Occupation: Leader      Previous Injuries/Diseases/Surgeries Contributing to the Condition:   None      Diagnosis: (S90.31XD) Contusion of right heel, subsequent encounter      Related to the Industrial Injury?       Explain:  DOI: 8/23/24. Stepped on bolt at work and fell, causing injury to right foot.      Objective Medical Findings:  Antalgic gait due to right heel pain.         None - Discharged                         Stable  Yes                  Ratable  No      X  Generally Improved                         Condition Worsened                  Condition Same  May Have Suffered a Permanent Disability  No     Treatment Plan:     May take over-the-counter Ibuprofen (Motrin or Advil) as needed for pain for anti-inflammatory effect - following over-the-counter dosing.    Prednisone prescribed for anti-inflammatory effect.         No Change in Therapy                  PT/OT Prescribed                    X  Medication May be Used While Working        Case Management                          PT/OT Discontinued    Consultation    Further Diagnostic Studies:    Prescription(s)            Prednisone prescribed for anti-inflammatory effect.     Released to FULL DUTY /No Restrictions on (Date):  From:      Certified TOTALLY TEMPORARILY DISABLED (Indicate Dates) From:   To:    X  Released to RESTRICTED/Modified Duty on (Date): From: 9/1/24  To:  9/8/24  Restrictions Are:   Temporary      No Sitting    No Standing   X No Pulling Other:  Maximum of 4 hours walking and standing total.        No Bending at Waist     No Stooping     No Lifting        No Carrying     No Walking Lifting Restricted to (lbs.):          No Pushing      X  No Climbing     No Reaching Above Shoulders       Date of Next Visit:   Return on 9/8/24 or sooner if needed. Date of this Exam: 9/1/2024 Physician/Chiropractic Physician Name: Mathew Amin M.D. Physician/Chiropractic Physician Signature:  Avni Artis DO MPH                                                                                                                                                                                                            D-39 (Rev. 2/24)

## 2024-09-01 NOTE — PROGRESS NOTES
Chief Complaint:    Chief Complaint   Patient presents with    Follow-Up     R Foot pain  W/c fv Doi  8/23/24        History of Present Illness:    Visit done with Language Line service.     DOI: 8/23/24. Right foot pain is a little better since last visit on 8/24/24, but still hurts, in lateral, plantar aspect of heel. Takes Ibuprofen as needed - helps sometimes.      Past Medical History:    Past Medical History:   Diagnosis Date    Headache(784.0)      Past Surgical History:    History reviewed. No pertinent surgical history.    Social History:    Social History     Socioeconomic History    Marital status:      Spouse name: Not on file    Number of children: Not on file    Years of education: Not on file    Highest education level: Not on file   Occupational History    Not on file   Tobacco Use    Smoking status: Never    Smokeless tobacco: Never   Vaping Use    Vaping status: Never Used   Substance and Sexual Activity    Alcohol use: No    Drug use: No    Sexual activity: Not on file   Other Topics Concern    Not on file   Social History Narrative    Not on file     Social Determinants of Health     Financial Resource Strain: Not on file   Food Insecurity: Not on file   Transportation Needs: Not on file   Physical Activity: Not on file   Stress: Not on file   Social Connections: Not on file   Intimate Partner Violence: Not on file   Housing Stability: Not on file     Family History:    Family History   Problem Relation Age of Onset    Diabetes Mother     Hyperlipidemia Mother     Cancer Maternal Grandmother     Cancer Maternal Grandfather      Medications:    No current outpatient medications on file prior to visit.     No current facility-administered medications on file prior to visit.     Allergies:    No Known Allergies      Vitals:    Vitals:    09/01/24 1001   BP: 110/70   Pulse: 95   Resp: 16   Temp: 36.6 °C (97.9 °F)   TempSrc: Temporal   SpO2: 97%   Weight: 68.9 kg (152 lb)   Height: 1.524 m (5')        Physical Exam:    Constitutional: Vital signs reviewed. Appears well-developed and well-nourished. No acute distress.   Eyes: Sclera white, conjunctivae clear.  ENT: External ears normal. Hearing normal.  Pulmonary/Chest: Respirations non-labored.  Musculoskeletal: Antalgic gait due to right heel pain.  Neurological: Alert and oriented to person, place, and time. Muscle tone normal. Coordination normal. Light touch and sensation normal.   Skin: No rashes or lesions. Warm, dry, normal turgor.  Psychiatric: Normal mood and affect. Behavior is normal. Judgment and thought content normal.       Assessment / Plan & Medical Decision Makin. Contusion of right heel, subsequent encounter  - predniSONE (DELTASONE) 20 MG Tab; 1 TAB BY MOUTH ONCE A DAY ONLY IF NEEDED FOR PAIN TO HELP INFLAMMATION. TAKE WITH FOOD.  Dispense: 7 Tablet; Refill: 0       Discussed with her DDX, management options, and risks, benefits, and alternatives to treatment plan agreed upon.    Visit done with Language Line service.     DOI: 24. Right foot pain is a little better since last visit on 24, but still hurts, in lateral, plantar aspect of heel. Takes Ibuprofen as needed - helps sometimes.    Antalgic gait due to right heel pain.    Complicated injury due to minimal improvement since last OV on 24. Due to this, offered her Prednisone Rx for more potent anti-inflammatory treatment. She would like.    Work restrictions: Maximum of 4 hours walking and standing total. No climbing and no pulling.    May take over-the-counter Ibuprofen (Motrin or Advil) as needed for pain for anti-inflammatory effect - following over-the-counter dosing.    Prednisone prescribed for anti-inflammatory effect.    Return on 24 or sooner if needed.

## 2024-09-08 ENCOUNTER — OCCUPATIONAL MEDICINE (OUTPATIENT)
Dept: URGENT CARE | Facility: PHYSICIAN GROUP | Age: 44
End: 2024-09-08
Payer: COMMERCIAL

## 2024-09-08 VITALS
SYSTOLIC BLOOD PRESSURE: 112 MMHG | BODY MASS INDEX: 29.84 KG/M2 | WEIGHT: 152 LBS | RESPIRATION RATE: 16 BRPM | OXYGEN SATURATION: 98 % | TEMPERATURE: 98 F | HEART RATE: 103 BPM | HEIGHT: 60 IN | DIASTOLIC BLOOD PRESSURE: 74 MMHG

## 2024-09-08 DIAGNOSIS — S90.31XD CONTUSION OF RIGHT HEEL, SUBSEQUENT ENCOUNTER: ICD-10-CM

## 2024-09-08 PROCEDURE — 99213 OFFICE O/P EST LOW 20 MIN: CPT | Performed by: PHYSICIAN ASSISTANT

## 2024-09-08 PROCEDURE — 3074F SYST BP LT 130 MM HG: CPT | Performed by: PHYSICIAN ASSISTANT

## 2024-09-08 PROCEDURE — 3078F DIAST BP <80 MM HG: CPT | Performed by: PHYSICIAN ASSISTANT

## 2024-09-08 ASSESSMENT — FIBROSIS 4 INDEX: FIB4 SCORE: 0.43

## 2024-09-08 NOTE — LETTER
PHYSICIAN’S AND CHIROPRACTIC PHYSICIAN'S   PROGRESS REPORT CERTIFICATION OF DISABILITY Claim Number:     Social Security Number:    Patient’s Name: Fallon Richardson Date of Injury: 8/23/2024   Employer: [employer] Name of MCO (if applicable):      Patient’s Job Description/Occupation: Leader       Previous Injuries/Diseases/Surgeries Contributing to the Condition:  None      Diagnosis: (S90.31XD) Contusion of right heel, subsequent encounter      Related to the Industrial Injury? Yes     Explain:        Objective Medical Findings: STS and pain right heel. No deformity. ROM WNL         None - Discharged                         Stable  Yes                 Ratable  No     X   Generally Improved                         Condition Worsened                  Condition Same  May Have Suffered a Permanent Disability No     Treatment Plan:              No Change in Therapy                  PT/OT Prescribed                      Medication May be Used While Working        Case Management                          PT/OT Discontinued    Consultation    Further Diagnostic Studies:    Prescription(s)                 Released to FULL DUTY /No Restrictions on (Date):  From:      Certified TOTALLY TEMPORARILY DISABLED (Indicate Dates) From:   To:    X  Released to RESTRICTED/Modified Duty on (Date): From: 9/8/2024 To: 9/15/2024  Restrictions Are:  Temporary      No Sitting    No Standing    No Pulling Other: Continue previous restrictions.       No Bending at Waist     No Stooping     No Lifting        No Carrying     No Walking Lifting Restricted to (lbs.):  < or = to 10 pounds       No Pushing        No Climbing     No Reaching Above Shoulders       Date of Next Visit:  9/15/2024 Date of this Exam: 9/8/2024 Physician/Chiropractic Physician Name: Tavo Euceda P.A.-C. Physician/Chiropractic Physician Signature:  Avni Artis DO MPH                                                                                                                                                                                                             D-39 (Rev. 2/24)

## 2024-09-08 NOTE — PROGRESS NOTES
Subjective     Fallon Richardson is a 44 y.o. female who presents with Follow-Up (W/c fv foot issue. Still having pain with walking, pain is worse in the morning.   )            HPI  Interval improvement noted.  Still having pain with weightbearing intermittently.  Tolerating light duty.  Using OTC meds with good relief.    ROS           Objective     /74   Pulse (!) 103   Temp 36.7 °C (98 °F) (Temporal)   Resp 16   Ht 1.524 m (5')   Wt 68.9 kg (152 lb)   SpO2 98%   BMI 29.69 kg/m²      Physical Exam    STS and pain right heel. No deformity. ROM WNL                   Assessment & Plan        Assessment & Plan  Contusion of right heel, subsequent encounter  Continue previous restrictions.  Follow-up 1 week               Please note that this dictation was created using voice recognition software. I have made every reasonable attempt to correct obvious errors, but I expect that there are errors of grammar and possibly content that I did not discover before finalizing the note.

## 2024-09-15 ENCOUNTER — OCCUPATIONAL MEDICINE (OUTPATIENT)
Dept: URGENT CARE | Facility: PHYSICIAN GROUP | Age: 44
End: 2024-09-15
Payer: COMMERCIAL

## 2024-09-15 VITALS
DIASTOLIC BLOOD PRESSURE: 74 MMHG | WEIGHT: 157 LBS | OXYGEN SATURATION: 97 % | TEMPERATURE: 97.7 F | RESPIRATION RATE: 14 BRPM | SYSTOLIC BLOOD PRESSURE: 104 MMHG | HEART RATE: 86 BPM | BODY MASS INDEX: 30.66 KG/M2

## 2024-09-15 DIAGNOSIS — M76.60 ACHILLES TENDON PAIN: ICD-10-CM

## 2024-09-15 DIAGNOSIS — S90.31XD CONTUSION OF RIGHT HEEL, SUBSEQUENT ENCOUNTER: ICD-10-CM

## 2024-09-15 PROCEDURE — 3078F DIAST BP <80 MM HG: CPT | Performed by: NURSE PRACTITIONER

## 2024-09-15 PROCEDURE — 3074F SYST BP LT 130 MM HG: CPT | Performed by: NURSE PRACTITIONER

## 2024-09-15 PROCEDURE — 99213 OFFICE O/P EST LOW 20 MIN: CPT | Performed by: NURSE PRACTITIONER

## 2024-09-15 ASSESSMENT — FIBROSIS 4 INDEX: FIB4 SCORE: 0.43

## 2024-09-15 NOTE — LETTER
PHYSICIAN’S AND CHIROPRACTIC PHYSICIAN'S   PROGRESS REPORT CERTIFICATION OF DISABILITY Claim Number:     Social Security Number:    Patient’s Name: Fallon Richardson Date of Injury: 8/23/2024   Employer: [employer] Name of MCO (if applicable):      Patient’s Job Description/Occupation: Leader       Previous Injuries/Diseases/Surgeries Contributing to the Condition:  no      Diagnosis: (S90.31XD) Contusion of right heel, subsequent encounter  (M76.60) Achilles tendon pain      Related to the Industrial Injury? 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  Patient states that her symptoms are the same as last visit and she is now having Achilles pain when walking.  Patient does continue to walk with a limp.  She states no pain at rest but continues to have pain with ambulation on left lateral ankle.  Patient denies any swelling, bruising, or redness.  Patient does not have any lower back pain at this time.  She has not been taking Tylenol or Motrin for pain relief.  She denies any new injury or trauma.  Has been able to go back to work on current work restrictions.      Objective Medical Findings: Right foot: Full range of motion.  TTP lateral edge of heel.  No swelling, erythema, or ecchymosis noted.  Patient reports mild pain with palpation of Achilles.  Patient does have full flexion extension of foot.  Pedal pulse 2+.  Distally neurovascular intact.         None - Discharged                         Stable  No                 Ratable  No        Generally Improved                         Condition Worsened               X   Condition Same  May Have Suffered a Permanent Disability No     Treatment Plan:    Patient placed in walking boot.  Recommended over-the-counter NSAIDs and acetaminophen.  Recommended cold compress seeing.  Patient is referred to  occupational medicine for further workup.         No Change in Therapy                  PT/OT Prescribed                      Medication May be Used While Working        Case Management                          PT/OT Discontinued    Consultation    Further Diagnostic Studies:    Prescription(s)                 Released to FULL DUTY /No Restrictions on (Date):  From:      Certified TOTALLY TEMPORARILY DISABLED (Indicate Dates) From:   To:    X  Released to RESTRICTED/Modified Duty on (Date): From: 9/15/2024 To: 9/30/2024  Restrictions Are:  Temporary      No Sitting    No Standing X   No Pulling Other: No walking or standing greater than 4 hours per shift       No Bending at Waist     No Stooping X    No Lifting        No Carrying X    No Walking Lifting Restricted to (lbs.):       X   No Pushing     X   No Climbing     No Reaching Above Shoulders       Date of Next Visit:  9/30/2024 Date of this Exam: 9/15/2024 Physician/Chiropractic Physician Name: JACKIE Roberts Physician/Chiropractic Physician Signature:  Avni Artis DO MPH                                                                                                                                                                                                            D-39 (Rev. 2/24)

## 2024-09-15 NOTE — PROGRESS NOTES
Subjective:     Fallon Richardson is a 44 y.o. 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  Patient states that her symptoms are the same as last visit and she is now having Achilles pain when walking.  Patient does continue to walk with a limp.  She states no pain at rest but continues to have pain with ambulation on left lateral ankle.  Patient denies any swelling, bruising, or redness.  Patient does not have any lower back pain at this time.  She has not been taking Tylenol or Motrin for pain relief.  She denies any new injury or trauma.  Has been able to go back to work on current work restrictions.       PMH:   No pertinent past medical history to this problem  MEDS:  Medications were reviewed in EMR  ALLERGIES:  Allergies were reviewed in EMR  SOCHX:  Works as a Leader   FH:   No pertinent family history to this problem       Objective:     /74   Pulse 86   Temp 36.5 °C (97.7 °F) (Temporal)   Resp 14   Wt 71.2 kg (157 lb)   SpO2 97%   BMI 30.66 kg/m²     Right foot: Full range of motion.  TTP lateral edge of heel.  No swelling, erythema, or ecchymosis noted.  Patient reports mild pain with palpation of Achilles.  Patient does have full flexion extension of foot.  Pedal pulse 2+.  Distally neurovascular intact.    Assessment/Plan:       1. Contusion of right heel, subsequent encounter  - Referral to Occupational Medicine    2. Achilles tendon pain  - Referral to Occupational Medicine    Patient placed in walking boot.  Recommended over-the-counter NSAIDs and acetaminophen.  Recommended cold compress seeing.  Patient is referred to occupational medicine for further workup.    Work restrictions include no standing or walking greater than 4 hours, no pushing, pulling, carrying, or lifting.  No climbing stairs.      FROM 09/15/2024   TO  09/30/2024      Differential diagnosis, natural history, supportive care, and indications for immediate follow-up discussed.

## 2024-09-15 NOTE — ADDENDUM NOTE
Addended by: PANFILO TRUONG on: 9/15/2024 11:41 AM     Modules accepted: Orders    
I will START or STAY ON the medications listed below when I get home from the hospital:  None

## 2024-10-24 ENCOUNTER — OCCUPATIONAL MEDICINE (OUTPATIENT)
Dept: URGENT CARE | Facility: PHYSICIAN GROUP | Age: 44
End: 2024-10-24
Payer: COMMERCIAL

## 2024-10-24 VITALS
RESPIRATION RATE: 14 BRPM | DIASTOLIC BLOOD PRESSURE: 68 MMHG | BODY MASS INDEX: 30.63 KG/M2 | WEIGHT: 156 LBS | OXYGEN SATURATION: 98 % | SYSTOLIC BLOOD PRESSURE: 112 MMHG | HEART RATE: 90 BPM | HEIGHT: 60 IN | TEMPERATURE: 97.6 F

## 2024-10-24 DIAGNOSIS — M76.60 ACHILLES TENDON PAIN: ICD-10-CM
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
## 2024-10-24 DIAGNOSIS — M79.671 CHRONIC HEEL PAIN, RIGHT: ICD-10-CM

## 2024-10-24 DIAGNOSIS — G89.29 CHRONIC HEEL PAIN, RIGHT: ICD-10-CM

## 2024-10-24 PROCEDURE — 3078F DIAST BP <80 MM HG: CPT | Performed by: NURSE PRACTITIONER

## 2024-10-24 PROCEDURE — 3074F SYST BP LT 130 MM HG: CPT | Performed by: NURSE PRACTITIONER

## 2024-10-24 PROCEDURE — 99214 OFFICE O/P EST MOD 30 MIN: CPT | Performed by: NURSE PRACTITIONER

## 2024-10-24 RX ORDER — MELOXICAM 15 MG/1
15 TABLET ORAL DAILY
Qty: 30 TABLET | Refills: 0 | Status: SHIPPED | OUTPATIENT
Start: 2024-10-24 | End: 2024-11-23

## 2024-10-24 ASSESSMENT — FIBROSIS 4 INDEX: FIB4 SCORE: 0.43

## 2024-10-29 ENCOUNTER — OCCUPATIONAL MEDICINE (OUTPATIENT)
Dept: OCCUPATIONAL MEDICINE | Facility: CLINIC | Age: 44
End: 2024-10-29
Payer: COMMERCIAL

## 2024-10-29 VITALS
RESPIRATION RATE: 20 BRPM | SYSTOLIC BLOOD PRESSURE: 112 MMHG | WEIGHT: 156 LBS | HEART RATE: 91 BPM | HEIGHT: 60 IN | DIASTOLIC BLOOD PRESSURE: 68 MMHG | BODY MASS INDEX: 30.63 KG/M2 | OXYGEN SATURATION: 98 % | TEMPERATURE: 97.5 F

## 2024-10-29 DIAGNOSIS — S86.011D STRAIN OF RIGHT ACHILLES TENDON, SUBSEQUENT ENCOUNTER: ICD-10-CM

## 2024-10-29 DIAGNOSIS — S96.911D RIGHT ANKLE STRAIN, SUBSEQUENT ENCOUNTER: ICD-10-CM

## 2024-10-29 DIAGNOSIS — S96.911D STRAIN OF RIGHT FOOT, SUBSEQUENT ENCOUNTER: ICD-10-CM

## 2024-10-29 ASSESSMENT — FIBROSIS 4 INDEX: FIB4 SCORE: 0.43

## 2024-10-31 ENCOUNTER — OCCUPATIONAL MEDICINE (OUTPATIENT)
Dept: OCCUPATIONAL MEDICINE | Facility: CLINIC | Age: 44
End: 2024-10-31
Payer: COMMERCIAL

## 2024-10-31 VITALS
WEIGHT: 150 LBS | HEIGHT: 60 IN | BODY MASS INDEX: 29.45 KG/M2 | HEART RATE: 102 BPM | SYSTOLIC BLOOD PRESSURE: 124 MMHG | DIASTOLIC BLOOD PRESSURE: 84 MMHG | OXYGEN SATURATION: 96 % | RESPIRATION RATE: 14 BRPM | TEMPERATURE: 98 F

## 2024-10-31 DIAGNOSIS — S96.911D RIGHT ANKLE STRAIN, SUBSEQUENT ENCOUNTER: ICD-10-CM

## 2024-10-31 DIAGNOSIS — S96.911D STRAIN OF RIGHT FOOT, SUBSEQUENT ENCOUNTER: ICD-10-CM

## 2024-10-31 DIAGNOSIS — S86.011D STRAIN OF RIGHT ACHILLES TENDON, SUBSEQUENT ENCOUNTER: ICD-10-CM

## 2024-10-31 ASSESSMENT — FIBROSIS 4 INDEX: FIB4 SCORE: 0.43

## 2024-10-31 ASSESSMENT — PAIN SCALES - GENERAL: PAINLEVEL: 7=MODERATE-SEVERE PAIN

## 2024-11-26 ENCOUNTER — OCCUPATIONAL MEDICINE (OUTPATIENT)
Dept: OCCUPATIONAL MEDICINE | Facility: CLINIC | Age: 44
End: 2024-11-26
Payer: COMMERCIAL

## 2024-11-26 VITALS
HEIGHT: 60 IN | WEIGHT: 159 LBS | TEMPERATURE: 97 F | DIASTOLIC BLOOD PRESSURE: 70 MMHG | BODY MASS INDEX: 31.22 KG/M2 | SYSTOLIC BLOOD PRESSURE: 120 MMHG | RESPIRATION RATE: 16 BRPM | HEART RATE: 103 BPM

## 2024-11-26 DIAGNOSIS — S96.911D RIGHT ANKLE STRAIN, SUBSEQUENT ENCOUNTER: ICD-10-CM

## 2024-11-26 DIAGNOSIS — S86.011D STRAIN OF RIGHT ACHILLES TENDON, SUBSEQUENT ENCOUNTER: ICD-10-CM

## 2024-11-26 DIAGNOSIS — S96.911D STRAIN OF RIGHT FOOT, SUBSEQUENT ENCOUNTER: ICD-10-CM

## 2024-11-26 ASSESSMENT — FIBROSIS 4 INDEX: FIB4 SCORE: 0.43

## 2024-11-26 ASSESSMENT — PAIN SCALES - GENERAL: PAINLEVEL_OUTOF10: 6=MODERATE PAIN

## 2024-11-26 NOTE — LETTER
PHYSICIAN’S AND CHIROPRACTIC PHYSICIAN'S   PROGRESS REPORT   CERTIFICATION OF DISABILITY Claim Number:     Social Security Number:    Patient’s Name: Fallon Richardson Date of Injury: 8/23/2024   Employer: [employer] Name of MCO (if applicable):      Patient’s Job Description/Occupation: Leader       Previous Injuries/Diseases/Surgeries Contributing to the Condition:         Diagnosis: (S86.011D) Strain of right Achilles tendon, subsequent encounter  (S96.911D) Strain of right foot, subsequent encounter  (S96.911D) Right ankle strain, subsequent encounter      Related to the Industrial Injury? Yes     Explain:        Objective Medical Findings: Right Foot/Ankle: Minimal swelling.  Tenderness to light touch over the distal Achilles tendon and tendon insertion.  Some tenderness over the calcaneus and lateral ankle diffusely.  Full range of motion with some discomfort with dorsiflexion.  Strength intact except for with minimal minimally decreased strength in dorsiflexion.  Antalgic gait.         None - Discharged                         Stable  No                 Ratable  No        Generally Improved                         Condition Worsened               X   Condition Same  May Have Suffered a Permanent Disability No     Treatment Plan:    Referrals for MRI right foot, approved, pending scheduling  Referral to orthopedics, pending approval  Continue physical therapy  Continue Meloxicam  Wean out of orthopedic walking boot as tolerated.  Increase walking as tolerated           No Change in Therapy                  PT/OT Prescribed                      Medication May be Used While Working        Case Management                          PT/OT Discontinued    Consultation    Further Diagnostic Studies:    Prescription(s)                 Released to FULL DUTY /No Restrictions on (Date):       Certified TOTALLY TEMPORARILY DISABLED (Indicate Dates) From:   To:    X  Released to  RESTRICTED/Modified Duty on (Date): From: 11/26/2024 To: 12/30/2024  Restrictions Are:  Temporary      No Sitting    No Standing    No Pulling Other: *SEATED WORK ONLY* if no seated work available injured worker to be sent home       No Bending at Waist     No Stooping     No Lifting        No Carrying     No Walking Lifting Restricted to (lbs.):  < or = to 10 pounds       No Pushing        No Climbing     No Reaching Above Shoulders       Date of Next Visit:  Monday 12/30/2024  @ 8:00 AM  Date of this Exam: 11/26/2024 Physician/Chiropractic Physician Name: Avni Artis D.O. Physician/Chiropractic Physician Signature:  Avni Artis DO MPH     [place]:  [address] [place] [address], [address] [place], [place] [address] - Telephone [phone] [place]:  [address] [place] [address], [address] [place], [place] [address] - Telephone [phone]    [email or web address]  D-39 (Rev. 10/24)

## 2024-11-26 NOTE — PROGRESS NOTES
Subjective:     Fallon Richardson is a 44 y.o. female who presents for Follow-Up (( FV / DOI 8.23.24 / (R) Foot / [employer]/SAME) RM 18)    MARCIO: She was pushing a cart and stepped on a bolt on the ground, she does describe twisting the ankle little bit during this incident.  She was seen in urgent care x6, x-rays of the foot were negative for acute findings.  She was advised NSAIDs, orthopedic walking boot and work restrictions.     10/29/2024: Patient states that overall symptoms are pretty much unchanged over the last 2 months.  Her pain is mostly over the distal Achilles tendon and the heel.  She does have some pain over the lateral ankle as well.  She states pain is worse with any prolonged standing or walking.  She states the ankle feels a little unstable.  She states that she was given the orthopedic walking boot about a week and a half ago and does have less pain walking in that.  She states she was just started on meloxicam last week as well has only taken it a few times.  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  She has not heard anything in regards to the orthopedic or referral for MRI that were placed.  Denies prior right foot or ankle injuries.     10/31/2024: Patient states that she was doing a lot of walking at work and had worsening symptoms.  She states she was seen by her supervisor who recommended that she get seen again.  She states that they her employer has not offered her any seated work for 50% of her shift as noted in the work restrictions.  She states that she still standing and walking the entire shift.    11/26/2024: Patient states overall symptoms are more or less the same.  Continues to have pain over the midfoot to the Achilles tendon.  She states still very difficult to walk without the boot.  Even at the boot she states she still walks with a limp and  is painful.  She states that her workplace has sent her home until she recovers.  She did start physical therapy which she felt like has been helpful but is only done 1 or 2 visits    ROS    SOCHX: Works as a  at [employer]  FH: No pertinent family history to this problem.       Objective:     /70 (BP Location: Left arm, Patient Position: Sitting, BP Cuff Size: Adult)   Pulse (!) 103   Temp 36.1 °C (97 °F)   Resp 16   Ht 1.524 m (5')   Wt 72.1 kg (159 lb)   BMI 31.05 kg/m²     Constitutional: Patient is in no acute distress. Appears well-developed and well-nourished.   Cardiovascular: Normal rate.    Pulmonary/Chest: Effort normal. No respiratory distress.   Neurological: Patient is alert and oriented to person, place, and time.   Skin: Skin is warm and dry.   Psychiatric: Normal mood and affect. Behavior is normal.     Right Foot/Ankle: Minimal swelling.  Tenderness to light touch over the distal Achilles tendon and tendon insertion.  Some tenderness over the calcaneus and lateral ankle diffusely.  Full range of motion with some discomfort with dorsiflexion.  Strength intact except for with minimal minimally decreased strength in dorsiflexion.  Antalgic gait.    Assessment/Plan:     1. Strain of right Achilles tendon, subsequent encounter    2. Strain of right foot, subsequent encounter    3. Right ankle strain, subsequent encounter      Referrals for MRI right foot, approved, pending scheduling  Referral to orthopedics, pending approval  Continue physical therapy  Continue Meloxicam  Wean out of orthopedic walking boot as tolerated.  Increase walking as tolerated      Work Status: Restricted Duty - see D-39 or other state/federal worker's compensation forms for specific restrictions if applicable  Follow up 4 weeks    Differential diagnosis, natural history, supportive care, and indications for immediate follow-up discussed.    Approximately 21 minutes were spent in reviewing notes,  preparing for visit, obtaining history, exam and evaluation, patient counseling/education, and post visit documentation/orders. Significant time was spent completing state/federal worker's compensation forms.

## 2024-12-19 ENCOUNTER — OCCUPATIONAL MEDICINE (OUTPATIENT)
Dept: OCCUPATIONAL MEDICINE | Facility: CLINIC | Age: 44
End: 2024-12-19
Payer: COMMERCIAL

## 2024-12-19 VITALS
WEIGHT: 159 LBS | TEMPERATURE: 97.3 F | HEART RATE: 114 BPM | BODY MASS INDEX: 31.22 KG/M2 | HEIGHT: 60 IN | DIASTOLIC BLOOD PRESSURE: 80 MMHG | OXYGEN SATURATION: 97 % | SYSTOLIC BLOOD PRESSURE: 110 MMHG

## 2024-12-19 DIAGNOSIS — S96.911D RIGHT ANKLE STRAIN, SUBSEQUENT ENCOUNTER: ICD-10-CM

## 2024-12-19 DIAGNOSIS — S86.011D STRAIN OF RIGHT ACHILLES TENDON, SUBSEQUENT ENCOUNTER: ICD-10-CM

## 2024-12-19 DIAGNOSIS — S96.911D STRAIN OF RIGHT FOOT, SUBSEQUENT ENCOUNTER: ICD-10-CM

## 2024-12-19 PROCEDURE — 3074F SYST BP LT 130 MM HG: CPT | Performed by: PREVENTIVE MEDICINE

## 2024-12-19 PROCEDURE — 99213 OFFICE O/P EST LOW 20 MIN: CPT | Performed by: PREVENTIVE MEDICINE

## 2024-12-19 PROCEDURE — 3079F DIAST BP 80-89 MM HG: CPT | Performed by: PREVENTIVE MEDICINE

## 2024-12-19 RX ORDER — MELOXICAM 15 MG/1
15 TABLET ORAL DAILY
Qty: 30 TABLET | Refills: 0 | Status: SHIPPED | OUTPATIENT
Start: 2024-12-19

## 2024-12-19 ASSESSMENT — FIBROSIS 4 INDEX: FIB4 SCORE: 0.43

## 2024-12-19 NOTE — LETTER
PHYSICIAN’S AND CHIROPRACTIC PHYSICIAN'S   PROGRESS REPORT   CERTIFICATION OF DISABILITY Claim Number:     Social Security Number:    Patient’s Name: Fallon Richardson Date of Injury: 8/23/2024   Employer: [employer] Name of MCO (if applicable):      Patient’s Job Description/Occupation: Leader       Previous Injuries/Diseases/Surgeries Contributing to the Condition:         Diagnosis: (S86.011D) Strain of right Achilles tendon, subsequent encounter  (S96.911D) Strain of right foot, subsequent encounter  (S96.911D) Right ankle strain, subsequent encounter      Related to the Industrial Injury? Yes     Explain:        Objective Medical Findings: Right Foot/Ankle: Minimal swelling.  Tenderness to light touch over the distal Achilles tendon and tendon insertion.  Some tenderness over the calcaneus and lateral ankle diffusely.  Full range of motion with some discomfort with dorsiflexion.  Strength intact except for with minimal minimally decreased strength in dorsiflexion.  Antalgic gait.    MRI right foot: Findings compatible with calcaneal enthesis, predominantly affecting the posterior calcaneal enthesophyte.  Plantar fasciitis noted.  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         None - Discharged                         Stable  No                 Ratable  No        Generally Improved                         Condition Worsened               X   Condition Same  May Have Suffered a Permanent Disability No     Treatment Plan:    Referral to orthopedics, pending approval  Recommend continuing physical therapy  Continue walking boot  Continue Meloxicam         No Change in Therapy                  PT/OT Prescribed                      Medication May be Used While Working        Case Management                          PT/OT Discontinued    Consultation    Further Diagnostic Studies:    Prescription(s)                 Released to FULL DUTY /No  Restrictions on (Date):       Certified TOTALLY TEMPORARILY DISABLED (Indicate Dates) From:   To:    X  Released to RESTRICTED/Modified Duty on (Date): From: 12/19/2024 To: 1/16/2025  Restrictions Are:         No Sitting    No Standing    No Pulling Other:  *SEATED WORK ONLY* if no seated work available injured worker to be sent home       No Bending at Waist     No Stooping     No Lifting        No Carrying     No Walking Lifting Restricted to (lbs.):  < or = to 10 pounds       No Pushing        No Climbing     No Reaching Above Shoulders       Date of Next Visit:  Thursday 1/16/2025  @ 11:00 AM Date of this Exam: 12/19/2024 Physician/Chiropractic Physician Name: Avni Artis D.O. Physician/Chiropractic Physician Signature:  Avni Artis DO [facility]:  [address] [place] [address], [address] [place], [place] [address] - Telephone [phone] [place]:  [address] [place] [address], [address] [place], [place] [address] - Telephone [phone]    [email or web address]  D-39 (Rev. 10/24)

## 2024-12-20 NOTE — PROGRESS NOTES
Subjective:     Fallon Richardson is a 44 y.o. female who presents for Follow-Up (DOI: 8/23/24 right foot )    MARCIO: She was pushing a cart and stepped on a bolt on the ground, she does describe twisting the ankle little bit during this incident.  She was seen in urgent care x6, x-rays of the foot were negative for acute findings.  She was advised NSAIDs, orthopedic walking boot and work restrictions.     10/29/2024: Patient states that overall symptoms are pretty much unchanged over the last 2 months.  Her pain is mostly over the distal Achilles tendon and the heel.  She does have some pain over the lateral ankle as well.  She states pain is worse with any prolonged standing or walking.  She states the ankle feels a little unstable.  She states that she was given the orthopedic walking boot about a week and a half ago and does have less pain walking in that.  She states she was just started on meloxicam last week as well has only taken it a few times.  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  She has not heard anything in regards to the orthopedic or referral for MRI that were placed.  Denies prior right foot or ankle injuries.     10/31/2024: Patient states that she was doing a lot of walking at work and had worsening symptoms.  She states she was seen by her supervisor who recommended that she get seen again.  She states that they her employer has not offered her any seated work for 50% of her shift as noted in the work restrictions.  She states that she still standing and walking the entire shift.     11/26/2024: Patient states overall symptoms are more or less the same.  Continues to have pain over the midfoot to the Achilles tendon.  She states still very difficult to walk without the boot.  Even at the boot she states she still walks with a limp and is painful.  She states that her  workplace has sent her home until she recovers.  She did start physical therapy which she felt like has been helpful but is only done 1 or 2 visits    12/19/2024: States symptoms are Primus the same.  Continues to get intermittent significant swelling and pain in the foot mostly around the distal Achilles, calcaneus and plantar surface of the foot.  Patient states that she gets a lot of swelling in her foot throughout the day.  Has yet to erythematic orthopedic appointment but was able to do 1 session of physical therapy and get the MRI performed.    ROS    SOCHX: Works as a  at [employer]  FH: No pertinent family history to this problem.       Objective:     /80 (BP Location: Left arm, Patient Position: Sitting, BP Cuff Size: Adult)   Pulse (!) 114   Temp 36.3 °C (97.3 °F) (Temporal)   Ht 1.524 m (5')   Wt 72.1 kg (159 lb)   SpO2 97%   BMI 31.05 kg/m²     Constitutional: Patient is in no acute distress. Appears well-developed and well-nourished.   Cardiovascular: Normal rate.    Pulmonary/Chest: Effort normal. No respiratory distress.   Neurological: Patient is alert and oriented to person, place, and time.   Skin: Skin is warm and dry.   Psychiatric: Normal mood and affect. Behavior is normal.     Right Foot/Ankle: Minimal swelling.  Tenderness to light touch over the distal Achilles tendon and tendon insertion.  Some tenderness over the calcaneus and lateral ankle diffusely.  Full range of motion with some discomfort with dorsiflexion.  Strength intact except for with minimal minimally decreased strength in dorsiflexion.  Antalgic gait.    MRI right foot: Findings compatible with calcaneal enthesis, predominantly affecting the posterior calcaneal enthesophyte.  Plantar fasciitis noted.  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    Assessment/Plan:     1. Strain of right Achilles tendon, subsequent encounter  - meloxicam (MOBIC) 15 MG tablet; Take  1 Tablet by mouth every day.  Dispense: 30 Tablet; Refill: 0    2. Strain of right foot, subsequent encounter  - meloxicam (MOBIC) 15 MG tablet; Take 1 Tablet by mouth every day.  Dispense: 30 Tablet; Refill: 0    3. Right ankle strain, subsequent encounter  - meloxicam (MOBIC) 15 MG tablet; Take 1 Tablet by mouth every day.  Dispense: 30 Tablet; Refill: 0      Referral to orthopedics, pending approval  Recommend continuing physical therapy  Continue walking boot  Continue Meloxicam    Work Status: Restricted Duty - see D-39 or other state/federal worker's compensation forms for specific restrictions if applicable  Follow up 4 weeks    Differential diagnosis, natural history, supportive care, and indications for immediate follow-up discussed.

## 2025-01-07 ENCOUNTER — OFFICE VISIT (OUTPATIENT)
Dept: URGENT CARE | Facility: PHYSICIAN GROUP | Age: 45
End: 2025-01-07
Payer: COMMERCIAL

## 2025-01-07 VITALS
HEART RATE: 117 BPM | OXYGEN SATURATION: 97 % | DIASTOLIC BLOOD PRESSURE: 74 MMHG | HEIGHT: 60 IN | RESPIRATION RATE: 30 BRPM | SYSTOLIC BLOOD PRESSURE: 130 MMHG | WEIGHT: 164.8 LBS | BODY MASS INDEX: 32.35 KG/M2 | TEMPERATURE: 99 F

## 2025-01-07 DIAGNOSIS — R00.0 TACHYCARDIA: ICD-10-CM

## 2025-01-07 DIAGNOSIS — R68.89 FLU-LIKE SYMPTOMS: ICD-10-CM

## 2025-01-07 DIAGNOSIS — R06.02 SHORTNESS OF BREATH: ICD-10-CM

## 2025-01-07 DIAGNOSIS — R06.82 TACHYPNEA: ICD-10-CM

## 2025-01-07 PROCEDURE — 99215 OFFICE O/P EST HI 40 MIN: CPT | Performed by: NURSE PRACTITIONER

## 2025-01-07 ASSESSMENT — ENCOUNTER SYMPTOMS
COUGH: 1
HEADACHES: 1
MYALGIAS: 1
VOMITING: 0
SORE THROAT: 0
ABDOMINAL PAIN: 0
DIARRHEA: 1
FEVER: 1
CHILLS: 1
SHORTNESS OF BREATH: 1
NAUSEA: 0
RHINORRHEA: 0

## 2025-01-07 ASSESSMENT — FIBROSIS 4 INDEX: FIB4 SCORE: 0.43

## 2025-01-08 NOTE — PROGRESS NOTES
Subjective:     Fallon Richardson is a 44 y.o. female who presents for Cough, Nasal Congestion, Diarrhea (Pt states she has had symptoms since Friday.), and Shortness of Breath (PT has extreme SOB)      Cough  This is a new problem. The current episode started in the past 7 days (Fallon is a pleasant Ukrainian speaking 44 year old female who presents to  today with complaints of flu like symptoms X 4 days). The problem has been rapidly worsening. The problem occurs constantly. The cough is Non-productive. Associated symptoms include chills, a fever, headaches, myalgias, shortness of breath, sweats and wheezing. Pertinent negatives include no ear pain, nasal congestion, rhinorrhea or sore throat. She has tried rest and body position changes (OTC cough, NSAIDS, Tylenol) for the symptoms.   Diarrhea   Associated symptoms include chills, coughing, a fever, headaches, myalgias and sweats. Pertinent negatives include no abdominal pain or vomiting.   Shortness of Breath  Associated symptoms include a fever, headaches and wheezing. Pertinent negatives include no abdominal pain, ear pain, rhinorrhea, sore throat or vomiting.         Review of Systems   Constitutional:  Positive for chills, fever and malaise/fatigue.   HENT:  Negative for congestion, ear pain, rhinorrhea and sore throat.    Respiratory:  Positive for cough, shortness of breath and wheezing.    Gastrointestinal:  Positive for diarrhea. Negative for abdominal pain, nausea and vomiting.   Musculoskeletal:  Positive for myalgias.   Neurological:  Positive for headaches.       PMH:   Past Medical History:   Diagnosis Date    Headache(784.0)      ALLERGIES: No Known Allergies  SURGHX: No past surgical history on file.  SOCHX:   Social History     Socioeconomic History    Marital status:    Tobacco Use    Smoking status: Never    Smokeless tobacco: Never   Vaping Use    Vaping status: Never Used   Substance and Sexual Activity    Alcohol use: No    Drug  use: No     FH:   Family History   Problem Relation Age of Onset    Diabetes Mother     Hyperlipidemia Mother     Cancer Maternal Grandmother     Cancer Maternal Grandfather          Objective:   /74 (BP Location: Left arm, Patient Position: Sitting, BP Cuff Size: Adult)   Pulse (!) 117   Temp 37.2 °C (99 °F) (Temporal)   Resp (!) 30   Ht 1.524 m (5')   Wt 74.8 kg (164 lb 12.8 oz)   SpO2 97%   BMI 32.19 kg/m²     Physical Exam  Constitutional:       General: She is in acute distress.      Appearance: She is ill-appearing, toxic-appearing and diaphoretic.   HENT:      Head: Normocephalic and atraumatic.      Right Ear: Tympanic membrane, ear canal and external ear normal.      Left Ear: Tympanic membrane, ear canal and external ear normal.      Nose: Congestion and rhinorrhea present.      Mouth/Throat:      Mouth: Mucous membranes are moist.      Pharynx: Posterior oropharyngeal erythema present.   Eyes:      Extraocular Movements: Extraocular movements intact.      Pupils: Pupils are equal, round, and reactive to light.   Cardiovascular:      Rate and Rhythm: Tachycardia present.   Pulmonary:      Effort: Tachypnea present. No respiratory distress.      Breath sounds: Decreased air movement present. No stridor. Examination of the right-upper field reveals decreased breath sounds. Examination of the left-upper field reveals decreased breath sounds. Examination of the right-middle field reveals decreased breath sounds. Examination of the left-middle field reveals decreased breath sounds. Examination of the right-lower field reveals decreased breath sounds. Examination of the left-lower field reveals decreased breath sounds. Decreased breath sounds present. No wheezing, rhonchi or rales.   Chest:      Chest wall: No tenderness.   Abdominal:      General: Bowel sounds are normal. There is no distension.      Palpations: There is no mass.      Tenderness: There is generalized abdominal tenderness. There is  no guarding or rebound.      Hernia: No hernia is present.   Musculoskeletal:      Cervical back: Tenderness present.   Neurological:      Mental Status: She is alert.         Assessment/Plan:   Assessment    1. Flu-like symptoms        2. Shortness of breath        3. Tachypnea        4. Tachycardia          Pt is acutely ill appearing in acutely ill-appearing in clinic today. At this time I am concerned for early sepsis and feel she is not safe for self transportation to hospital. EMS arrived for transport and report given. PT to seek further care at [facility]. Family and pt is in agreement.  used for translation today. Testing not performed as she is being transferred to other facility.

## 2025-01-09 ASSESSMENT — ENCOUNTER SYMPTOMS
SWEATS: 1
WHEEZING: 1

## 2025-01-16 ENCOUNTER — OCCUPATIONAL MEDICINE (OUTPATIENT)
Dept: OCCUPATIONAL MEDICINE | Facility: CLINIC | Age: 45
End: 2025-01-16
Payer: COMMERCIAL

## 2025-01-16 VITALS
RESPIRATION RATE: 20 BRPM | WEIGHT: 164 LBS | TEMPERATURE: 98 F | OXYGEN SATURATION: 97 % | HEIGHT: 60 IN | HEART RATE: 96 BPM | BODY MASS INDEX: 32.2 KG/M2 | DIASTOLIC BLOOD PRESSURE: 72 MMHG | SYSTOLIC BLOOD PRESSURE: 118 MMHG

## 2025-01-16 DIAGNOSIS — S96.911D RIGHT ANKLE STRAIN, SUBSEQUENT ENCOUNTER: ICD-10-CM

## 2025-01-16 DIAGNOSIS — S96.911D STRAIN OF RIGHT FOOT, SUBSEQUENT ENCOUNTER: ICD-10-CM

## 2025-01-16 DIAGNOSIS — S86.011D STRAIN OF RIGHT ACHILLES TENDON, SUBSEQUENT ENCOUNTER: ICD-10-CM

## 2025-01-16 PROCEDURE — 99213 OFFICE O/P EST LOW 20 MIN: CPT | Performed by: PREVENTIVE MEDICINE

## 2025-01-16 ASSESSMENT — FIBROSIS 4 INDEX: FIB4 SCORE: 0.43

## 2025-01-16 NOTE — LETTER
PHYSICIAN’S AND CHIROPRACTIC PHYSICIAN'S   PROGRESS REPORT   CERTIFICATION OF DISABILITY Claim Number:     Social Security Number:    Patient’s Name: Fallon Richardson Date of Injury: 8/23/2024   Employer: [employer] Name of MCO (if applicable):      Patient’s Job Description/Occupation: Leader       Previous Injuries/Diseases/Surgeries Contributing to the Condition:         Diagnosis: (S86.011D) Strain of right Achilles tendon, subsequent encounter  (S96.911D) Strain of right foot, subsequent encounter  (S96.911D) Right ankle strain, subsequent encounter      Related to the Industrial Injury? Yes     Explain:        Objective Medical Findings: Right Foot/Ankle: Minimal swelling.  Tenderness to light touch over the distal Achilles tendon and tendon insertion.  Some tenderness over the calcaneus and lateral ankle diffusely.  Full range of motion with some discomfort with dorsiflexion.  Strength intact except for with minimal minimally decreased strength in dorsiflexion.  Antalgic gait.     MRI right foot: Findings compatible with calcaneal enthesis, predominantly affecting the posterior calcaneal enthesophyte.  Plantar fasciitis noted.  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           None - Discharged                         Stable  No                 Ratable  No        Generally Improved                         Condition Worsened               X   Condition Same  May Have Suffered a Permanent Disability No     Treatment Plan:    Referral to orthopedics, never received auth or appointment but referral was closed and so a new one was placed today  Recommend continuing physical therapy  Continue walking boot  Continue Meloxicam once daily as needed           No Change in Therapy                  PT/OT Prescribed                      Medication May be Used While Working        Case Management                          PT/OT Discontinued     Consultation    Further Diagnostic Studies:    Prescription(s)                 Released to FULL DUTY /No Restrictions on (Date):       Certified TOTALLY TEMPORARILY DISABLED (Indicate Dates) From:   To:    X  Released to RESTRICTED/Modified Duty on (Date): From: 1/16/2025 To: 2/20/2025  Restrictions Are:  Temporary      No Sitting    No Standing    No Pulling Other: *SEATED WORK ONLY* if no seated work available injured worker to be sent home       No Bending at Waist     No Stooping     No Lifting        No Carrying     No Walking Lifting Restricted to (lbs.):  < or = to 10 pounds       No Pushing        No Climbing     No Reaching Above Shoulders       Date of Next Visit:  3/20/2025 at 8:00 AM Date of this Exam: 1/16/2025 Physician/Chiropractic Physician Name: Avni Artis D.O. Physician/Chiropractic Physician Signature:  Avni Artis DO [facility]:  [address] [place] [address], [address] [place], [place] [address] - Telephone [phone] [place]:  [address] [place] [address], [address] [place], [place] [address] - Telephone [phone]    [email or web address]  D-39 (Rev. 10/24)

## 2025-01-16 NOTE — PROGRESS NOTES
Subjective:     Fallon Richardson is a 44 y.o. female who presents for Follow-Up (WC DOI 8/23/24 Rt foot, Rm 17)    MARCIO: She was pushing a cart and stepped on a bolt on the ground, she does describe twisting the ankle little bit during this incident.  She was seen in urgent care x6, x-rays of the foot were negative for acute findings.  She was advised NSAIDs, orthopedic walking boot and work restrictions.     10/29/2024: Patient states that overall symptoms are pretty much unchanged over the last 2 months.  Her pain is mostly over the distal Achilles tendon and the heel.  She does have some pain over the lateral ankle as well.  She states pain is worse with any prolonged standing or walking.  She states the ankle feels a little unstable.  She states that she was given the orthopedic walking boot about a week and a half ago and does have less pain walking in that.  She states she was just started on meloxicam last week as well has only taken it a few times.  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  She has not heard anything in regards to the orthopedic or referral for MRI that were placed.  Denies prior right foot or ankle injuries.     10/31/2024: Patient states that she was doing a lot of walking at work and had worsening symptoms.  She states she was seen by her supervisor who recommended that she get seen again.  She states that they her employer has not offered her any seated work for 50% of her shift as noted in the work restrictions.  She states that she still standing and walking the entire shift.     11/26/2024: Patient states overall symptoms are more or less the same.  Continues to have pain over the midfoot to the Achilles tendon.  She states still very difficult to walk without the boot.  Even at the boot she states she still walks with a limp and is painful.  She states that her  workplace has sent her home until she recovers.  She did start physical therapy which she felt like has been helpful but is only done 1 or 2 visits     12/19/2024: States symptoms are Primus the same.  Continues to get intermittent significant swelling and pain in the foot mostly around the distal Achilles, calcaneus and plantar surface of the foot.  Patient states that she gets a lot of swelling in her foot throughout the day.  Has yet to erythematic orthopedic appointment but was able to do 1 session of physical therapy and get the MRI performed.    1/16/2025: Patient states that symptoms are unchanged despite physical therapy.  Continues significant pain in the heel extending into the Achilles tendon.  Pain with any weightbearing.  Has not heard anything from the work comp insurance in regards to the orthopedic referral.  She states been difficult to get a hold of them.    ROS    SOCHX: Works as a  at [employer]  FH: No pertinent family history to this problem.       Objective:     /72   Pulse 96   Temp 36.7 °C (98 °F)   Resp 20   Ht 1.524 m (5')   Wt 74.4 kg (164 lb)   SpO2 97%   BMI 32.03 kg/m²     Constitutional: Patient is in no acute distress. Appears well-developed and well-nourished.   Cardiovascular: Normal rate.    Pulmonary/Chest: Effort normal. No respiratory distress.   Neurological: Patient is alert and oriented to person, place, and time.   Skin: Skin is warm and dry.   Psychiatric: Normal mood and affect. Behavior is normal.     Right Foot/Ankle: Minimal swelling.  Tenderness to light touch over the distal Achilles tendon and tendon insertion.  Some tenderness over the calcaneus and lateral ankle diffusely.  Full range of motion with some discomfort with dorsiflexion.  Strength intact except for with minimal minimally decreased strength in dorsiflexion.  Antalgic gait.     MRI right foot: Findings compatible with calcaneal enthesis, predominantly affecting the posterior  calcaneal enthesophyte.  Plantar fasciitis noted.  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      Assessment/Plan:     1. Strain of right Achilles tendon, subsequent encounter  - Referral to Orthopedics    2. Strain of right foot, subsequent encounter  - Referral to Orthopedics    3. Right ankle strain, subsequent encounter  - Referral to Orthopedics      Referral to orthopedics, never received auth or appointment but referral was closed and so a new one was placed today  Recommend continuing physical therapy  Continue walking boot  Continue Meloxicam once daily as needed      Work Status: Restricted Duty - see D-39 or other state/federal worker's compensation forms for specific restrictions if applicable  Follow up 4 weeks    Differential diagnosis, natural history, supportive care, and indications for immediate follow-up discussed.